# Patient Record
Sex: MALE | Race: WHITE | Employment: FULL TIME | URBAN - METROPOLITAN AREA
[De-identification: names, ages, dates, MRNs, and addresses within clinical notes are randomized per-mention and may not be internally consistent; named-entity substitution may affect disease eponyms.]

---

## 2018-07-12 ENCOUNTER — HOSPITAL ENCOUNTER (EMERGENCY)
Age: 58
Discharge: HOME | End: 2018-07-12
Attending: EMERGENCY MEDICINE
Payer: COMMERCIAL

## 2018-07-12 ENCOUNTER — APPOINTMENT (OUTPATIENT)
Dept: CT IMAGING | Age: 58
End: 2018-07-12
Payer: COMMERCIAL

## 2018-07-12 VITALS
TEMPERATURE: 97.9 F | HEIGHT: 71 IN | OXYGEN SATURATION: 97 % | BODY MASS INDEX: 26.6 KG/M2 | DIASTOLIC BLOOD PRESSURE: 75 MMHG | SYSTOLIC BLOOD PRESSURE: 135 MMHG | WEIGHT: 190 LBS | RESPIRATION RATE: 16 BRPM | HEART RATE: 71 BPM

## 2018-07-12 DIAGNOSIS — N20.0 KIDNEY STONE: Primary | ICD-10-CM

## 2018-07-12 LAB
ALBUMIN SERPL-MCNC: 4.2 G/DL (ref 3.9–4.9)
ALP BLD-CCNC: 63 U/L (ref 35–104)
ALT SERPL-CCNC: 38 U/L (ref 0–41)
ANION GAP SERPL CALCULATED.3IONS-SCNC: 12 MEQ/L (ref 7–13)
AST SERPL-CCNC: 25 U/L (ref 0–40)
BASOPHILS ABSOLUTE: 0.1 K/UL (ref 0–0.2)
BASOPHILS RELATIVE PERCENT: 0.4 %
BILIRUB SERPL-MCNC: 0.6 MG/DL (ref 0–1.2)
BUN BLDV-MCNC: 23 MG/DL (ref 6–20)
CALCIUM SERPL-MCNC: 8.9 MG/DL (ref 8.6–10.2)
CHLORIDE BLD-SCNC: 98 MEQ/L (ref 98–107)
CO2: 27 MEQ/L (ref 22–29)
CREAT SERPL-MCNC: 0.8 MG/DL (ref 0.7–1.2)
EOSINOPHILS ABSOLUTE: 0 K/UL (ref 0–0.7)
EOSINOPHILS RELATIVE PERCENT: 0.3 %
GFR AFRICAN AMERICAN: >60
GFR NON-AFRICAN AMERICAN: >60
GLOBULIN: 3.1 G/DL (ref 2.3–3.5)
GLUCOSE BLD-MCNC: 252 MG/DL (ref 74–109)
HCT VFR BLD CALC: 42.8 % (ref 42–52)
HEMOGLOBIN: 15 G/DL (ref 14–18)
LIPASE: 34 U/L (ref 13–60)
LYMPHOCYTES ABSOLUTE: 1.8 K/UL (ref 1–4.8)
LYMPHOCYTES RELATIVE PERCENT: 14.1 %
MCH RBC QN AUTO: 30.9 PG (ref 27–31.3)
MCHC RBC AUTO-ENTMCNC: 35 % (ref 33–37)
MCV RBC AUTO: 88.2 FL (ref 80–100)
MONOCYTES ABSOLUTE: 0.6 K/UL (ref 0.2–0.8)
MONOCYTES RELATIVE PERCENT: 5.1 %
NEUTROPHILS ABSOLUTE: 10.2 K/UL (ref 1.4–6.5)
NEUTROPHILS RELATIVE PERCENT: 80.1 %
PDW BLD-RTO: 13 % (ref 11.5–14.5)
PLATELET # BLD: 240 K/UL (ref 130–400)
POTASSIUM SERPL-SCNC: 3.8 MEQ/L (ref 3.5–5.1)
RBC # BLD: 4.85 M/UL (ref 4.7–6.1)
SODIUM BLD-SCNC: 137 MEQ/L (ref 132–144)
TOTAL PROTEIN: 7.3 G/DL (ref 6.4–8.1)
WBC # BLD: 12.7 K/UL (ref 4.8–10.8)

## 2018-07-12 PROCEDURE — 74176 CT ABD & PELVIS W/O CONTRAST: CPT

## 2018-07-12 PROCEDURE — 99284 EMERGENCY DEPT VISIT MOD MDM: CPT

## 2018-07-12 PROCEDURE — 85025 COMPLETE CBC W/AUTO DIFF WBC: CPT

## 2018-07-12 PROCEDURE — 2580000003 HC RX 258: Performed by: EMERGENCY MEDICINE

## 2018-07-12 PROCEDURE — 83690 ASSAY OF LIPASE: CPT

## 2018-07-12 PROCEDURE — 6360000002 HC RX W HCPCS: Performed by: EMERGENCY MEDICINE

## 2018-07-12 PROCEDURE — 96375 TX/PRO/DX INJ NEW DRUG ADDON: CPT

## 2018-07-12 PROCEDURE — 80053 COMPREHEN METABOLIC PANEL: CPT

## 2018-07-12 PROCEDURE — 36415 COLL VENOUS BLD VENIPUNCTURE: CPT

## 2018-07-12 PROCEDURE — 96374 THER/PROPH/DIAG INJ IV PUSH: CPT

## 2018-07-12 RX ORDER — ONDANSETRON 4 MG/1
4 TABLET, FILM COATED ORAL EVERY 8 HOURS PRN
Qty: 20 TABLET | Refills: 0 | Status: ON HOLD | OUTPATIENT
Start: 2018-07-12 | End: 2021-11-03

## 2018-07-12 RX ORDER — 0.9 % SODIUM CHLORIDE 0.9 %
1000 INTRAVENOUS SOLUTION INTRAVENOUS ONCE
Status: COMPLETED | OUTPATIENT
Start: 2018-07-12 | End: 2018-07-12

## 2018-07-12 RX ORDER — TRAMADOL HYDROCHLORIDE 50 MG/1
50 TABLET ORAL EVERY 6 HOURS PRN
COMMUNITY

## 2018-07-12 RX ORDER — LEVOTHYROXINE SODIUM 0.07 MG/1
75 TABLET ORAL DAILY
COMMUNITY

## 2018-07-12 RX ORDER — LOSARTAN POTASSIUM 25 MG/1
25 TABLET ORAL DAILY
Status: ON HOLD | COMMUNITY
End: 2021-11-03

## 2018-07-12 RX ORDER — GABAPENTIN 600 MG/1
600 TABLET ORAL 3 TIMES DAILY
COMMUNITY

## 2018-07-12 RX ORDER — OXYCODONE HYDROCHLORIDE AND ACETAMINOPHEN 5; 325 MG/1; MG/1
1 TABLET ORAL EVERY 6 HOURS PRN
Qty: 12 TABLET | Refills: 0 | Status: SHIPPED | OUTPATIENT
Start: 2018-07-12 | End: 2018-07-15

## 2018-07-12 RX ORDER — GLIPIZIDE 5 MG/1
5 TABLET ORAL DAILY
Status: ON HOLD | COMMUNITY
End: 2021-11-03

## 2018-07-12 RX ORDER — NAPROXEN 500 MG/1
500 TABLET ORAL 2 TIMES DAILY
Qty: 60 TABLET | Refills: 0 | Status: SHIPPED | OUTPATIENT
Start: 2018-07-12 | End: 2020-08-27

## 2018-07-12 RX ORDER — ATORVASTATIN CALCIUM 40 MG/1
40 TABLET, FILM COATED ORAL DAILY
COMMUNITY

## 2018-07-12 RX ORDER — TAMSULOSIN HYDROCHLORIDE 0.4 MG/1
0.4 CAPSULE ORAL DAILY
Qty: 30 CAPSULE | Refills: 0 | Status: SHIPPED | OUTPATIENT
Start: 2018-07-12 | End: 2019-08-10

## 2018-07-12 RX ORDER — KETOROLAC TROMETHAMINE 30 MG/ML
30 INJECTION, SOLUTION INTRAMUSCULAR; INTRAVENOUS ONCE
Status: COMPLETED | OUTPATIENT
Start: 2018-07-12 | End: 2018-07-12

## 2018-07-12 RX ORDER — ONDANSETRON 2 MG/ML
4 INJECTION INTRAMUSCULAR; INTRAVENOUS ONCE
Status: COMPLETED | OUTPATIENT
Start: 2018-07-12 | End: 2018-07-12

## 2018-07-12 RX ORDER — PIOGLITAZONEHYDROCHLORIDE 45 MG/1
45 TABLET ORAL DAILY
Status: ON HOLD | COMMUNITY
End: 2021-11-03

## 2018-07-12 RX ADMIN — KETOROLAC TROMETHAMINE 30 MG: 30 INJECTION, SOLUTION INTRAMUSCULAR at 17:52

## 2018-07-12 RX ADMIN — SODIUM CHLORIDE 1000 ML: 9 INJECTION, SOLUTION INTRAVENOUS at 17:52

## 2018-07-12 RX ADMIN — HYDROMORPHONE HYDROCHLORIDE 1 MG: 1 INJECTION, SOLUTION INTRAMUSCULAR; INTRAVENOUS; SUBCUTANEOUS at 17:52

## 2018-07-12 RX ADMIN — ONDANSETRON 4 MG: 2 INJECTION, SOLUTION INTRAMUSCULAR; INTRAVENOUS at 17:52

## 2018-07-12 ASSESSMENT — ENCOUNTER SYMPTOMS
BACK PAIN: 0
ABDOMINAL PAIN: 1
NAUSEA: 1
VOMITING: 1
COUGH: 0
DIARRHEA: 0
SORE THROAT: 0
SHORTNESS OF BREATH: 0

## 2018-07-12 ASSESSMENT — PAIN DESCRIPTION - LOCATION: LOCATION: ABDOMEN;BACK

## 2018-07-12 ASSESSMENT — PAIN SCALES - GENERAL
PAINLEVEL_OUTOF10: 4
PAINLEVEL_OUTOF10: 8
PAINLEVEL_OUTOF10: 10

## 2018-07-12 ASSESSMENT — PAIN DESCRIPTION - PAIN TYPE: TYPE: ACUTE PAIN

## 2018-07-12 NOTE — ED PROVIDER NOTES
MG TABLET    Take 50 mg by mouth every 6 hours as needed for Pain. Pam[facility] ALLERGIES     Patient has no known allergies. FAMILY HISTORY     History reviewed. No pertinent family history. SOCIAL HISTORY       Social History     Social History    Marital status:      Spouse name: N/A    Number of children: N/A    Years of education: N/A     Social History Main Topics    Smoking status: Former Smoker    Smokeless tobacco: Never Used    Alcohol use No    Drug use: No    Sexual activity: Not Asked     Other Topics Concern    None     Social History Narrative    None         PHYSICAL EXAM       ED Triage Vitals [07/12/18 1706]   BP Temp Temp Source Pulse Resp SpO2 Height Weight   (!) 145/77 97.9 °F (36.6 °C) Oral 59 14 99 % 5' 11\" (1.803 m) 190 lb (86.2 kg)       Physical Exam   Constitutional: He is oriented to person, place, and time. He appears well-developed. He appears distressed. HENT:   Head: Normocephalic. Right Ear: External ear normal.   Left Ear: External ear normal.   Mouth/Throat: Oropharynx is clear and moist.   Eyes: Conjunctivae are normal. Pupils are equal, round, and reactive to light. Neck: Normal range of motion. Neck supple. Cardiovascular: Normal rate, regular rhythm and normal heart sounds. Pulmonary/Chest: Effort normal and breath sounds normal.   Abdominal: Soft. Bowel sounds are normal. He exhibits no distension. There is tenderness. Left sided flank pain radiating to LLQ. No guarding or peritoneal signs. Musculoskeletal: Normal range of motion. Neurological: He is alert and oriented to person, place, and time. Skin: Skin is warm and dry. Psychiatric: He has a normal mood and affect. Nursing note and vitals reviewed. MDM  Patient presented to ED with abdominal pain, nausea, and vomiting. Patient was given 1L NS, IV toradol, IV zofran, and IV dilaudid. Labs remarkable for WBC 13, glucose 252. CT AP shows 2 mm kidney stone at L UVJ.   Pt reassessed and feels much better. Pt educated thoroughly about kidney stones. Pt given prescription for percocet, zofran, naproxen, flomax. Pt does not want to f/u with urologist as this is not a recurring problem. Pt given kidney stone warning signs and f/u with pcp. Pt understands plan. FINAL IMPRESSION      1. Kidney stone          DISPOSITION/PLAN   DISPOSITION Decision To Discharge 07/12/2018 06:52:13 PM        DISCHARGE MEDICATIONS:  New Prescriptions    NAPROXEN (NAPROSYN) 500 MG TABLET    Take 1 tablet by mouth 2 times daily    ONDANSETRON (ZOFRAN) 4 MG TABLET    Take 1 tablet by mouth every 8 hours as needed for Nausea or Vomiting    OXYCODONE-ACETAMINOPHEN (PERCOCET) 5-325 MG PER TABLET    Take 1 tablet by mouth every 6 hours as needed for Pain for up to 3 days. Intended supply: 3 days. Take lowest dose possible to manage pain.     TAMSULOSIN (FLOMAX) 0.4 MG CAPSULE    Take 1 capsule by mouth daily            Dana Quezada MD (electronically signed)  Attending Emergency Physician            Dana Quezada MD  07/12/18 6066

## 2019-08-10 ENCOUNTER — APPOINTMENT (OUTPATIENT)
Dept: CT IMAGING | Age: 59
End: 2019-08-10
Payer: COMMERCIAL

## 2019-08-10 ENCOUNTER — HOSPITAL ENCOUNTER (EMERGENCY)
Age: 59
Discharge: HOME | End: 2019-08-10
Attending: EMERGENCY MEDICINE
Payer: COMMERCIAL

## 2019-08-10 VITALS
HEIGHT: 71 IN | SYSTOLIC BLOOD PRESSURE: 125 MMHG | BODY MASS INDEX: 26.6 KG/M2 | OXYGEN SATURATION: 98 % | WEIGHT: 190 LBS | DIASTOLIC BLOOD PRESSURE: 70 MMHG | HEART RATE: 74 BPM | RESPIRATION RATE: 18 BRPM | TEMPERATURE: 97.3 F

## 2019-08-10 DIAGNOSIS — N20.0 KIDNEY STONE: Primary | ICD-10-CM

## 2019-08-10 PROCEDURE — 96375 TX/PRO/DX INJ NEW DRUG ADDON: CPT

## 2019-08-10 PROCEDURE — 6370000000 HC RX 637 (ALT 250 FOR IP): Performed by: EMERGENCY MEDICINE

## 2019-08-10 PROCEDURE — 2580000003 HC RX 258: Performed by: EMERGENCY MEDICINE

## 2019-08-10 PROCEDURE — 99284 EMERGENCY DEPT VISIT MOD MDM: CPT

## 2019-08-10 PROCEDURE — 74176 CT ABD & PELVIS W/O CONTRAST: CPT

## 2019-08-10 PROCEDURE — 96374 THER/PROPH/DIAG INJ IV PUSH: CPT

## 2019-08-10 PROCEDURE — 80053 COMPREHEN METABOLIC PANEL: CPT

## 2019-08-10 PROCEDURE — 85025 COMPLETE CBC W/AUTO DIFF WBC: CPT

## 2019-08-10 PROCEDURE — 6360000002 HC RX W HCPCS: Performed by: EMERGENCY MEDICINE

## 2019-08-10 RX ORDER — MORPHINE SULFATE 2 MG/ML
4 INJECTION, SOLUTION INTRAMUSCULAR; INTRAVENOUS
Status: DISCONTINUED | OUTPATIENT
Start: 2019-08-10 | End: 2019-08-10 | Stop reason: HOSPADM

## 2019-08-10 RX ORDER — 0.9 % SODIUM CHLORIDE 0.9 %
1000 INTRAVENOUS SOLUTION INTRAVENOUS ONCE
Status: COMPLETED | OUTPATIENT
Start: 2019-08-10 | End: 2019-08-10

## 2019-08-10 RX ORDER — OXYCODONE HYDROCHLORIDE AND ACETAMINOPHEN 5; 325 MG/1; MG/1
2 TABLET ORAL ONCE
Status: COMPLETED | OUTPATIENT
Start: 2019-08-10 | End: 2019-08-10

## 2019-08-10 RX ORDER — ONDANSETRON 4 MG/1
4 TABLET, ORALLY DISINTEGRATING ORAL 3 TIMES DAILY PRN
Qty: 21 TABLET | Refills: 0 | Status: ON HOLD | OUTPATIENT
Start: 2019-08-10 | End: 2021-11-03

## 2019-08-10 RX ORDER — OXYCODONE HYDROCHLORIDE AND ACETAMINOPHEN 5; 325 MG/1; MG/1
1 TABLET ORAL EVERY 6 HOURS PRN
Qty: 12 TABLET | Refills: 0 | Status: SHIPPED | OUTPATIENT
Start: 2019-08-10 | End: 2019-08-13

## 2019-08-10 RX ORDER — TAMSULOSIN HYDROCHLORIDE 0.4 MG/1
0.4 CAPSULE ORAL DAILY
Qty: 30 CAPSULE | Refills: 0 | Status: ON HOLD | OUTPATIENT
Start: 2019-08-10 | End: 2021-11-03

## 2019-08-10 RX ORDER — ONDANSETRON 2 MG/ML
4 INJECTION INTRAMUSCULAR; INTRAVENOUS ONCE
Status: COMPLETED | OUTPATIENT
Start: 2019-08-10 | End: 2019-08-10

## 2019-08-10 RX ORDER — KETOROLAC TROMETHAMINE 30 MG/ML
30 INJECTION, SOLUTION INTRAMUSCULAR; INTRAVENOUS ONCE
Status: COMPLETED | OUTPATIENT
Start: 2019-08-10 | End: 2019-08-10

## 2019-08-10 RX ADMIN — MORPHINE SULFATE 4 MG: 2 INJECTION, SOLUTION INTRAMUSCULAR; INTRAVENOUS at 18:23

## 2019-08-10 RX ADMIN — ONDANSETRON 4 MG: 2 INJECTION INTRAMUSCULAR; INTRAVENOUS at 18:23

## 2019-08-10 RX ADMIN — OXYCODONE HYDROCHLORIDE AND ACETAMINOPHEN 2 TABLET: 5; 325 TABLET ORAL at 19:46

## 2019-08-10 RX ADMIN — KETOROLAC TROMETHAMINE 30 MG: 30 INJECTION, SOLUTION INTRAMUSCULAR at 18:22

## 2019-08-10 RX ADMIN — SODIUM CHLORIDE 1000 ML: 9 INJECTION, SOLUTION INTRAVENOUS at 18:22

## 2019-08-10 ASSESSMENT — PAIN DESCRIPTION - FREQUENCY
FREQUENCY: INTERMITTENT
FREQUENCY: INTERMITTENT

## 2019-08-10 ASSESSMENT — PAIN SCALES - GENERAL
PAINLEVEL_OUTOF10: 9
PAINLEVEL_OUTOF10: 4
PAINLEVEL_OUTOF10: 9
PAINLEVEL_OUTOF10: 4

## 2019-08-10 ASSESSMENT — ENCOUNTER SYMPTOMS
SHORTNESS OF BREATH: 0
COUGH: 0
SORE THROAT: 0
NAUSEA: 1
BACK PAIN: 0
ABDOMINAL PAIN: 0
VOMITING: 0
DIARRHEA: 0

## 2019-08-10 ASSESSMENT — PAIN DESCRIPTION - ORIENTATION: ORIENTATION: LEFT

## 2019-08-10 ASSESSMENT — PAIN DESCRIPTION - DESCRIPTORS: DESCRIPTORS: SHARP

## 2019-08-10 ASSESSMENT — PAIN DESCRIPTION - LOCATION: LOCATION: FLANK

## 2019-08-10 ASSESSMENT — PAIN DESCRIPTION - PROGRESSION: CLINICAL_PROGRESSION: GRADUALLY IMPROVING

## 2019-08-11 LAB
ALBUMIN SERPL-MCNC: 4.3 G/DL (ref 3.5–4.6)
ALP BLD-CCNC: 67 U/L (ref 35–104)
ALT SERPL-CCNC: 24 U/L (ref 0–41)
ANION GAP SERPL CALCULATED.3IONS-SCNC: 12 MEQ/L (ref 9–15)
AST SERPL-CCNC: 19 U/L (ref 0–40)
BASOPHILS ABSOLUTE: 0 K/UL (ref 0–0.2)
BASOPHILS RELATIVE PERCENT: 0.5 %
BILIRUB SERPL-MCNC: 0.3 MG/DL (ref 0.2–0.7)
BUN BLDV-MCNC: 25 MG/DL (ref 6–20)
CALCIUM SERPL-MCNC: 9.2 MG/DL (ref 8.5–9.9)
CHLORIDE BLD-SCNC: 93 MEQ/L (ref 95–107)
CO2: 30 MEQ/L (ref 20–31)
CREAT SERPL-MCNC: 0.75 MG/DL (ref 0.7–1.2)
EOSINOPHILS ABSOLUTE: 0 K/UL (ref 0–0.7)
EOSINOPHILS RELATIVE PERCENT: 0.2 %
GFR AFRICAN AMERICAN: >60
GFR NON-AFRICAN AMERICAN: >60
GLOBULIN: 3.3 G/DL (ref 2.3–3.5)
GLUCOSE BLD-MCNC: 208 MG/DL (ref 70–99)
HCT VFR BLD CALC: 40.2 % (ref 42–52)
HEMOGLOBIN: 14.4 G/DL (ref 14–18)
LYMPHOCYTES ABSOLUTE: 1.9 K/UL (ref 1–4.8)
LYMPHOCYTES RELATIVE PERCENT: 19.3 %
MCH RBC QN AUTO: 31.3 PG (ref 27–31.3)
MCHC RBC AUTO-ENTMCNC: 35.8 % (ref 33–37)
MCV RBC AUTO: 87.2 FL (ref 80–100)
MONOCYTES ABSOLUTE: 0.7 K/UL (ref 0.2–0.8)
MONOCYTES RELATIVE PERCENT: 7 %
NEUTROPHILS ABSOLUTE: 7.4 K/UL (ref 1.4–6.5)
NEUTROPHILS RELATIVE PERCENT: 73 %
PDW BLD-RTO: 13.2 % (ref 11.5–14.5)
PLATELET # BLD: 255 K/UL (ref 130–400)
POTASSIUM SERPL-SCNC: 3.1 MEQ/L (ref 3.4–4.9)
RBC # BLD: 4.6 M/UL (ref 4.7–6.1)
SODIUM BLD-SCNC: 135 MEQ/L (ref 135–144)
TOTAL PROTEIN: 7.6 G/DL (ref 6.3–8)
WBC # BLD: 10.1 K/UL (ref 4.8–10.8)

## 2020-03-16 ENCOUNTER — APPOINTMENT (OUTPATIENT)
Dept: GENERAL RADIOLOGY | Age: 60
End: 2020-03-16
Payer: COMMERCIAL

## 2020-03-16 ENCOUNTER — HOSPITAL ENCOUNTER (EMERGENCY)
Age: 60
Discharge: HOME | End: 2020-03-16
Payer: COMMERCIAL

## 2020-03-16 VITALS
RESPIRATION RATE: 18 BRPM | BODY MASS INDEX: 27.16 KG/M2 | DIASTOLIC BLOOD PRESSURE: 77 MMHG | TEMPERATURE: 98.3 F | OXYGEN SATURATION: 97 % | HEIGHT: 71 IN | HEART RATE: 80 BPM | WEIGHT: 194 LBS | SYSTOLIC BLOOD PRESSURE: 141 MMHG

## 2020-03-16 LAB
ALBUMIN SERPL-MCNC: 3.9 G/DL (ref 3.5–4.6)
ALP BLD-CCNC: 59 U/L (ref 35–104)
ALT SERPL-CCNC: 41 U/L (ref 0–41)
ANION GAP SERPL CALCULATED.3IONS-SCNC: 15 MEQ/L (ref 9–15)
AST SERPL-CCNC: 40 U/L (ref 0–40)
BASOPHILS ABSOLUTE: 0.1 K/UL (ref 0–0.2)
BASOPHILS RELATIVE PERCENT: 0.8 %
BILIRUB SERPL-MCNC: <0.2 MG/DL (ref 0.2–0.7)
BUN BLDV-MCNC: 18 MG/DL (ref 6–20)
CALCIUM SERPL-MCNC: 8.7 MG/DL (ref 8.5–9.9)
CHLORIDE BLD-SCNC: 95 MEQ/L (ref 95–107)
CO2: 28 MEQ/L (ref 20–31)
CREAT SERPL-MCNC: 0.69 MG/DL (ref 0.7–1.2)
EOSINOPHILS ABSOLUTE: 0 K/UL (ref 0–0.7)
EOSINOPHILS RELATIVE PERCENT: 0.3 %
GFR AFRICAN AMERICAN: >60
GFR NON-AFRICAN AMERICAN: >60
GLOBULIN: 3.3 G/DL (ref 2.3–3.5)
GLUCOSE BLD-MCNC: 123 MG/DL (ref 70–99)
HCT VFR BLD CALC: 39 % (ref 42–52)
HEMOGLOBIN: 13.6 G/DL (ref 14–18)
INFLUENZA A BY PCR: POSITIVE
INFLUENZA B BY PCR: NEGATIVE
LYMPHOCYTES ABSOLUTE: 1.1 K/UL (ref 1–4.8)
LYMPHOCYTES RELATIVE PERCENT: 14.3 %
MCH RBC QN AUTO: 29.8 PG (ref 27–31.3)
MCHC RBC AUTO-ENTMCNC: 34.8 % (ref 33–37)
MCV RBC AUTO: 85.8 FL (ref 80–100)
MONOCYTES ABSOLUTE: 1.1 K/UL (ref 0.2–0.8)
MONOCYTES RELATIVE PERCENT: 13.7 %
NEUTROPHILS ABSOLUTE: 5.5 K/UL (ref 1.4–6.5)
NEUTROPHILS RELATIVE PERCENT: 70.9 %
PDW BLD-RTO: 13 % (ref 11.5–14.5)
PLATELET # BLD: 278 K/UL (ref 130–400)
POTASSIUM SERPL-SCNC: 3 MEQ/L (ref 3.4–4.9)
RBC # BLD: 4.55 M/UL (ref 4.7–6.1)
SODIUM BLD-SCNC: 138 MEQ/L (ref 135–144)
TOTAL PROTEIN: 7.2 G/DL (ref 6.3–8)
WBC # BLD: 7.8 K/UL (ref 4.8–10.8)

## 2020-03-16 PROCEDURE — 85025 COMPLETE CBC W/AUTO DIFF WBC: CPT

## 2020-03-16 PROCEDURE — 6370000000 HC RX 637 (ALT 250 FOR IP): Performed by: PHYSICIAN ASSISTANT

## 2020-03-16 PROCEDURE — 96374 THER/PROPH/DIAG INJ IV PUSH: CPT

## 2020-03-16 PROCEDURE — 6360000002 HC RX W HCPCS: Performed by: PHYSICIAN ASSISTANT

## 2020-03-16 PROCEDURE — 99283 EMERGENCY DEPT VISIT LOW MDM: CPT

## 2020-03-16 PROCEDURE — 74022 RADEX COMPL AQT ABD SERIES: CPT

## 2020-03-16 PROCEDURE — 80053 COMPREHEN METABOLIC PANEL: CPT

## 2020-03-16 PROCEDURE — 87502 INFLUENZA DNA AMP PROBE: CPT

## 2020-03-16 PROCEDURE — 2580000003 HC RX 258: Performed by: PHYSICIAN ASSISTANT

## 2020-03-16 PROCEDURE — 36415 COLL VENOUS BLD VENIPUNCTURE: CPT

## 2020-03-16 PROCEDURE — 6370000000 HC RX 637 (ALT 250 FOR IP): Performed by: EMERGENCY MEDICINE

## 2020-03-16 RX ORDER — POTASSIUM CHLORIDE 20 MEQ/1
40 TABLET, EXTENDED RELEASE ORAL ONCE
Status: COMPLETED | OUTPATIENT
Start: 2020-03-16 | End: 2020-03-16

## 2020-03-16 RX ORDER — OSELTAMIVIR PHOSPHATE 75 MG/1
75 CAPSULE ORAL 2 TIMES DAILY
Qty: 10 CAPSULE | Refills: 0 | Status: SHIPPED | OUTPATIENT
Start: 2020-03-16 | End: 2020-03-21

## 2020-03-16 RX ORDER — OSELTAMIVIR PHOSPHATE 75 MG/1
75 CAPSULE ORAL ONCE
Status: COMPLETED | OUTPATIENT
Start: 2020-03-16 | End: 2020-03-16

## 2020-03-16 RX ORDER — 0.9 % SODIUM CHLORIDE 0.9 %
1000 INTRAVENOUS SOLUTION INTRAVENOUS ONCE
Status: COMPLETED | OUTPATIENT
Start: 2020-03-16 | End: 2020-03-16

## 2020-03-16 RX ORDER — METHYLPREDNISOLONE SODIUM SUCCINATE 125 MG/2ML
125 INJECTION, POWDER, LYOPHILIZED, FOR SOLUTION INTRAMUSCULAR; INTRAVENOUS ONCE
Status: COMPLETED | OUTPATIENT
Start: 2020-03-16 | End: 2020-03-16

## 2020-03-16 RX ADMIN — SODIUM CHLORIDE 1000 ML: 9 INJECTION, SOLUTION INTRAVENOUS at 19:35

## 2020-03-16 RX ADMIN — ALBUTEROL SULFATE 5 MG: 2.5 SOLUTION RESPIRATORY (INHALATION) at 19:18

## 2020-03-16 RX ADMIN — ALBUTEROL SULFATE 5 MG: 2.5 SOLUTION RESPIRATORY (INHALATION) at 19:15

## 2020-03-16 RX ADMIN — ALBUTEROL SULFATE 5 MG: 2.5 SOLUTION RESPIRATORY (INHALATION) at 19:17

## 2020-03-16 RX ADMIN — POTASSIUM CHLORIDE 40 MEQ: 20 TABLET, EXTENDED RELEASE ORAL at 20:39

## 2020-03-16 RX ADMIN — METHYLPREDNISOLONE SODIUM SUCCINATE 125 MG: 125 INJECTION, POWDER, FOR SOLUTION INTRAMUSCULAR; INTRAVENOUS at 19:35

## 2020-03-16 RX ADMIN — OSELTAMIVIR PHOSPHATE 75 MG: 75 CAPSULE ORAL at 20:59

## 2020-03-16 ASSESSMENT — ENCOUNTER SYMPTOMS
RHINORRHEA: 0
COUGH: 1
SHORTNESS OF BREATH: 1
BACK PAIN: 0
SINUS PAIN: 0
EYE REDNESS: 0
CHEST TIGHTNESS: 0
COLOR CHANGE: 0
ABDOMINAL PAIN: 0
VOMITING: 0
EYE DISCHARGE: 0
DIARRHEA: 0
NAUSEA: 0

## 2020-03-16 NOTE — ED PROVIDER NOTES
Mouth: Mucous membranes are moist.   Eyes:      Extraocular Movements: Extraocular movements intact. Pupils: Pupils are equal, round, and reactive to light. Neck:      Musculoskeletal: Normal range of motion and neck supple. Cardiovascular:      Rate and Rhythm: Normal rate and regular rhythm. Pulses: Normal pulses. Heart sounds: Normal heart sounds. Pulmonary:      Effort: Pulmonary effort is normal.      Breath sounds: Examination of the right-middle field reveals wheezing. Examination of the left-middle field reveals wheezing. Examination of the right-lower field reveals wheezing. Examination of the left-lower field reveals wheezing. Wheezing present. Abdominal:      General: Abdomen is flat. Bowel sounds are normal. There is no distension. Palpations: Abdomen is soft. Tenderness: There is no abdominal tenderness. Musculoskeletal: Normal range of motion. General: No swelling, tenderness or signs of injury. Skin:     General: Skin is warm and dry. Capillary Refill: Capillary refill takes less than 2 seconds. Findings: No erythema. Neurological:      General: No focal deficit present. Mental Status: He is alert and oriented to person, place, and time. Psychiatric:         Mood and Affect: Mood normal.         DIAGNOSTIC RESULTS     EKG: All EKG's are interpreted by the Emergency Department Physician who either signs or Co-signsthis chart in the absence of a cardiologist.        RADIOLOGY:   Cass Blotter such as CT, Ultrasound and MRI are read by the radiologist. Plain radiographic images are visualized and preliminarily interpreted by the emergency physician with the below findings:    Negative for infiltrates or signs of infection.      Interpretation per the Radiologist below, if available at the time ofthis note:    XR Acute Abd Series Chest 1 VW    (Results Pending)         ED BEDSIDE ULTRASOUND:   Performed by ED Physician -

## 2020-08-27 ENCOUNTER — HOSPITAL ENCOUNTER (EMERGENCY)
Age: 60
Discharge: HOME | End: 2020-08-27
Payer: COMMERCIAL

## 2020-08-27 ENCOUNTER — APPOINTMENT (OUTPATIENT)
Dept: GENERAL RADIOLOGY | Age: 60
End: 2020-08-27
Payer: COMMERCIAL

## 2020-08-27 VITALS
BODY MASS INDEX: 27.3 KG/M2 | SYSTOLIC BLOOD PRESSURE: 157 MMHG | DIASTOLIC BLOOD PRESSURE: 73 MMHG | HEART RATE: 86 BPM | TEMPERATURE: 97.6 F | OXYGEN SATURATION: 95 % | RESPIRATION RATE: 18 BRPM | WEIGHT: 195 LBS | HEIGHT: 71 IN

## 2020-08-27 PROCEDURE — 72110 X-RAY EXAM L-2 SPINE 4/>VWS: CPT

## 2020-08-27 PROCEDURE — 96372 THER/PROPH/DIAG INJ SC/IM: CPT

## 2020-08-27 PROCEDURE — 99283 EMERGENCY DEPT VISIT LOW MDM: CPT

## 2020-08-27 PROCEDURE — 6360000002 HC RX W HCPCS: Performed by: PHYSICIAN ASSISTANT

## 2020-08-27 RX ORDER — OXYCODONE HYDROCHLORIDE AND ACETAMINOPHEN 5; 325 MG/1; MG/1
1 TABLET ORAL EVERY 6 HOURS PRN
Qty: 12 TABLET | Refills: 0 | Status: SHIPPED | OUTPATIENT
Start: 2020-08-27 | End: 2020-08-30

## 2020-08-27 RX ORDER — CAPSAICIN 0.025 %
CREAM (GRAM) TOPICAL
Qty: 1 TUBE | Refills: 0 | Status: SHIPPED | OUTPATIENT
Start: 2020-08-27 | End: 2020-09-26

## 2020-08-27 RX ORDER — METHYLPREDNISOLONE ACETATE 80 MG/ML
80 INJECTION, SUSPENSION INTRA-ARTICULAR; INTRALESIONAL; INTRAMUSCULAR; SOFT TISSUE ONCE
Status: COMPLETED | OUTPATIENT
Start: 2020-08-27 | End: 2020-08-27

## 2020-08-27 RX ADMIN — METHYLPREDNISOLONE ACETATE 80 MG: 80 INJECTION, SUSPENSION INTRA-ARTICULAR; INTRALESIONAL; INTRAMUSCULAR; SOFT TISSUE at 18:08

## 2020-08-27 ASSESSMENT — ENCOUNTER SYMPTOMS
BACK PAIN: 1
EYES NEGATIVE: 1
RESPIRATORY NEGATIVE: 1
GASTROINTESTINAL NEGATIVE: 1

## 2020-08-27 ASSESSMENT — PAIN DESCRIPTION - ORIENTATION: ORIENTATION: LEFT

## 2020-08-27 ASSESSMENT — PAIN DESCRIPTION - PROGRESSION: CLINICAL_PROGRESSION: NOT CHANGED

## 2020-08-27 ASSESSMENT — PAIN SCALES - WONG BAKER: WONGBAKER_NUMERICALRESPONSE: 2

## 2020-08-27 ASSESSMENT — PAIN SCALES - GENERAL: PAINLEVEL_OUTOF10: 2

## 2020-08-27 ASSESSMENT — PAIN DESCRIPTION - FREQUENCY: FREQUENCY: INTERMITTENT

## 2020-08-27 ASSESSMENT — PAIN - FUNCTIONAL ASSESSMENT: PAIN_FUNCTIONAL_ASSESSMENT: ACTIVITIES ARE NOT PREVENTED

## 2020-08-27 ASSESSMENT — PAIN DESCRIPTION - ONSET: ONSET: ON-GOING

## 2020-08-27 ASSESSMENT — PAIN DESCRIPTION - LOCATION: LOCATION: BACK

## 2020-08-27 ASSESSMENT — PAIN DESCRIPTION - DESCRIPTORS: DESCRIPTORS: SHOOTING

## 2020-08-27 ASSESSMENT — PAIN DESCRIPTION - PAIN TYPE: TYPE: ACUTE PAIN

## 2020-08-27 NOTE — ED TRIAGE NOTES
Pt reports that he is having lower back pain on the left side shooting down leg pt alert rr even non labored ambulated to triage

## 2020-08-27 NOTE — ED PROVIDER NOTES
[facility] ED  eMERGENCY dEPARTMENT eNCOUnter      Pt Name: Josesito Chapman  MRN: [id number]  [place] 1960  Date of evaluation: 8/27/2020  Provider: Janice Vela PA-C      HISTORY OF PRESENT ILLNESS    Josesito Chapman is a 64 y.o. male who presents to the Emergency Department with chief complaint of left hip and low back pain. Patient states he had a history of a traumatic back injury several years ago with surgery and hardware placement. Patient states over the last month his pain has been radiating down his left lower leg. He denies loss in bowel or bladder function he also denies numbness and tingling in the private area. Patient states he was given a shot of Toradol at his primary care physician's office yesterday without any relief. Patient drove himself here to the emergency room. Patient has a follow-up with Dr. Geoff Feliciano, but it is not for 1 month. REVIEW OF SYSTEMS       Review of Systems   Constitutional: Negative. HENT: Negative. Eyes: Negative. Respiratory: Negative. Cardiovascular: Negative. Gastrointestinal: Negative. Endocrine: Negative. Genitourinary: Negative. Musculoskeletal: Positive for back pain. Skin: Negative. Neurological: Negative. Psychiatric/Behavioral: Negative. PAST MEDICAL HISTORY     Past Medical History:   Diagnosis Date    Diabetes mellitus ([address]     Hyperlipidemia     Hypertension     Kidney stones     Neuropathy     Thyroid disease          SURGICAL HISTORY       Past Surgical History:   Procedure Laterality Date    BACK SURGERY      KNEE SURGERY Right          CURRENT MEDICATIONS       Previous Medications    ATORVASTATIN (LIPITOR) 40 MG TABLET    Take 40 mg by mouth daily    GABAPENTIN (NEURONTIN) 600 MG TABLET    Take 600 mg by mouth 3 times daily. Brendan Ch     GLIPIZIDE (GLUCOTROL) 5 MG TABLET    Take 5 mg by mouth daily    LEVOTHYROXINE (SYNTHROID) 75 MCG TABLET    Take 75 mcg by mouth Daily    LOSARTAN (COZAAR) 25 MG TABLET    Take 25 mg by mouth daily    ONDANSETRON (ZOFRAN) 4 MG TABLET    Take 1 tablet by mouth every 8 hours as needed for Nausea or Vomiting    ONDANSETRON (ZOFRAN-ODT) 4 MG DISINTEGRATING TABLET    Take 1 tablet by mouth 3 times daily as needed for Nausea or Vomiting    PIOGLITAZONE (ACTOS) 45 MG TABLET    Take 45 mg by mouth daily    TAMSULOSIN (FLOMAX) 0.4 MG CAPSULE    Take 1 capsule by mouth daily    TRAMADOL (ULTRAM) 50 MG TABLET    Take 50 mg by mouth every 6 hours as needed for Pain. Groton Plana ALLERGIES     Patient has no known allergies. FAMILY HISTORY     History reviewed. No pertinent family history.        SOCIAL HISTORY       Social History     Socioeconomic History    Marital status:      Spouse name: None    Number of children: None    Years of education: None    Highest education level: None   Occupational History    None   Social Needs    Financial resource strain: None    Food insecurity     Worry: None     Inability: None    Transportation needs     Medical: None     Non-medical: None   Tobacco Use    Smoking status: Former Smoker    Smokeless tobacco: Never Used   Substance and Sexual Activity    Alcohol use: No    Drug use: No    Sexual activity: None   Lifestyle    Physical activity     Days per week: None     Minutes per session: None    Stress: None   Relationships    Social connections     Talks on phone: None     Gets together: None     Attends Yazidism service: None     Active member of club or organization: None     Attends meetings of clubs or organizations: None     Relationship status: None    Intimate partner violence     Fear of current or ex partner: None     Emotionally abused: None     Physically abused: None     Forced sexual activity: None   Other Topics Concern    None   Social History Narrative    None       SCREENINGS      @FLOW([id number])@      PHYSICAL EXAM    (up to 7 for level 4, 8 or more for level 5)     ED Triage Vitals [08/27/20 1744]   BP Temp Temp Source Pulse Resp SpO2 Height Weight   (!) 157/73 97.6 °F (36.4 °C) Temporal 86 18 95 % 5' 11\" (1.803 m) 195 lb (88.5 kg)       Physical Exam  Constitutional:       General: He is not in acute distress. Appearance: He is well-developed. HENT:      Head: Normocephalic and atraumatic. Eyes:      Conjunctiva/sclera: Conjunctivae normal.      Pupils: Pupils are equal, round, and reactive to light. Neck:      Musculoskeletal: Normal range of motion and neck supple. Cardiovascular:      Rate and Rhythm: Normal rate and regular rhythm. Heart sounds: No murmur. Pulmonary:      Effort: No respiratory distress. Breath sounds: Normal breath sounds. No wheezing or rales. Abdominal:      General: There is no distension. Palpations: Abdomen is soft. Tenderness: There is no abdominal tenderness. Musculoskeletal: Normal range of motion. Left hip: He exhibits tenderness and bony tenderness. Lumbar back: He exhibits tenderness and bony tenderness. Back:         Legs:    Skin:     General: Skin is warm and dry. Findings: No erythema or rash. Neurological:      Mental Status: He is alert and oriented to person, place, and time. Cranial Nerves: No cranial nerve deficit. Psychiatric:         Judgment: Judgment normal.           All other labs were within normal range or not returned as of this dictation. EMERGENCY DEPARTMENT COURSE and DIFFERENTIALDIAGNOSIS/MDM:   Vitals:    Vitals:    08/27/20 1744   BP: (!) 157/73   Pulse: 86   Resp: 18   Temp: 97.6 °F (36.4 °C)   TempSrc: Temporal   SpO2: 95%   Weight: 195 lb (88.5 kg)   Height: 5' 11\" (1.803 m)        Patient given Depo-Medrol for pain while in the emergency room. OARRS reviewed and is negative. Patient on chronic Ultram prescription, but it is not helping his pain.   Patient will be placed on a temporary course of Percocet and is to hold off on taking Ultram.  Follow-up with  Geraldine Krishna or Dr. Helder Howe. Patient to also follow-up with primary care provider for reevaluation treatment. Return if symptoms worsen or if new concerning symptoms arise. Patient verbalizes understanding of plan at discharge and has no further questions. PROCEDURES:  Unless otherwise noted below, none     Procedures      FINAL IMPRESSION      1. Sciatica of left side    2.  Osteoarthritis of lumbar spine with myelopathy          DISPOSITION/PLAN   DISPOSITION Decision To Discharge 08/27/2020 06:48:23 PM          Thalia Pimentel PA-C (electronically signed)  Attending Emergency Physician  [address] [place] [address], DARSHAN  08/27/20 3328

## 2021-10-30 ENCOUNTER — HOSPITAL ENCOUNTER (EMERGENCY)
Age: 61
Discharge: ANOTHER HOSPITAL | End: 2021-10-30
Attending: EMERGENCY MEDICINE
Payer: COMMERCIAL

## 2021-10-30 ENCOUNTER — APPOINTMENT (OUTPATIENT)
Dept: GENERAL RADIOLOGY | Age: 61
End: 2021-10-30
Payer: COMMERCIAL

## 2021-10-30 VITALS
HEART RATE: 68 BPM | OXYGEN SATURATION: 95 % | TEMPERATURE: 98 F | RESPIRATION RATE: 18 BRPM | HEIGHT: 71 IN | SYSTOLIC BLOOD PRESSURE: 113 MMHG | DIASTOLIC BLOOD PRESSURE: 97 MMHG | WEIGHT: 177 LBS | BODY MASS INDEX: 24.78 KG/M2

## 2021-10-30 DIAGNOSIS — S68.012A AMPUTATION OF LEFT THUMB, INITIAL ENCOUNTER: Primary | ICD-10-CM

## 2021-10-30 LAB
ABO/RH: NORMAL
ALBUMIN SERPL-MCNC: 4.1 G/DL (ref 3.5–4.6)
ALP BLD-CCNC: 72 U/L (ref 35–104)
ALT SERPL-CCNC: 37 U/L (ref 0–41)
ANION GAP SERPL CALCULATED.3IONS-SCNC: 10 MEQ/L (ref 9–15)
ANTIBODY SCREEN: NORMAL
APTT: 25.2 SEC (ref 24.4–36.8)
AST SERPL-CCNC: 28 U/L (ref 0–40)
BASOPHILS ABSOLUTE: 0 K/UL (ref 0–0.2)
BASOPHILS RELATIVE PERCENT: 0.4 %
BILIRUB SERPL-MCNC: 0.6 MG/DL (ref 0.2–0.7)
BUN BLDV-MCNC: 16 MG/DL (ref 6–20)
CALCIUM SERPL-MCNC: 8.8 MG/DL (ref 8.5–9.9)
CHLORIDE BLD-SCNC: 97 MEQ/L (ref 95–107)
CO2: 30 MEQ/L (ref 20–31)
CREAT SERPL-MCNC: 0.66 MG/DL (ref 0.7–1.2)
EOSINOPHILS ABSOLUTE: 0.1 K/UL (ref 0–0.7)
EOSINOPHILS RELATIVE PERCENT: 0.8 %
GFR AFRICAN AMERICAN: >60
GFR NON-AFRICAN AMERICAN: >60
GLOBULIN: 2.6 G/DL (ref 2.3–3.5)
GLUCOSE BLD-MCNC: 213 MG/DL (ref 70–99)
HCT VFR BLD CALC: 41.5 % (ref 42–52)
HEMOGLOBIN: 14.5 G/DL (ref 14–18)
INR BLD: 1
LYMPHOCYTES ABSOLUTE: 2.9 K/UL (ref 1–4.8)
LYMPHOCYTES RELATIVE PERCENT: 31.1 %
MCH RBC QN AUTO: 29.7 PG (ref 27–31.3)
MCHC RBC AUTO-ENTMCNC: 34.9 % (ref 33–37)
MCV RBC AUTO: 85 FL (ref 80–100)
MONOCYTES ABSOLUTE: 0.7 K/UL (ref 0.2–0.8)
MONOCYTES RELATIVE PERCENT: 7.3 %
NEUTROPHILS ABSOLUTE: 5.6 K/UL (ref 1.4–6.5)
NEUTROPHILS RELATIVE PERCENT: 60.4 %
PDW BLD-RTO: 12.6 % (ref 11.5–14.5)
PLATELET # BLD: 265 K/UL (ref 130–400)
POTASSIUM SERPL-SCNC: 3 MEQ/L (ref 3.4–4.9)
PROTHROMBIN TIME: 13 SEC (ref 12.3–14.9)
RBC # BLD: 4.88 M/UL (ref 4.7–6.1)
SODIUM BLD-SCNC: 137 MEQ/L (ref 135–144)
TOTAL PROTEIN: 6.7 G/DL (ref 6.3–8)
WBC # BLD: 9.3 K/UL (ref 4.8–10.8)

## 2021-10-30 PROCEDURE — 80053 COMPREHEN METABOLIC PANEL: CPT

## 2021-10-30 PROCEDURE — 85025 COMPLETE CBC W/AUTO DIFF WBC: CPT

## 2021-10-30 PROCEDURE — 99283 EMERGENCY DEPT VISIT LOW MDM: CPT

## 2021-10-30 PROCEDURE — 73140 X-RAY EXAM OF FINGER(S): CPT

## 2021-10-30 PROCEDURE — 96365 THER/PROPH/DIAG IV INF INIT: CPT

## 2021-10-30 PROCEDURE — 85610 PROTHROMBIN TIME: CPT

## 2021-10-30 PROCEDURE — 86901 BLOOD TYPING SEROLOGIC RH(D): CPT

## 2021-10-30 PROCEDURE — 86850 RBC ANTIBODY SCREEN: CPT

## 2021-10-30 PROCEDURE — 2580000003 HC RX 258: Performed by: EMERGENCY MEDICINE

## 2021-10-30 PROCEDURE — 36415 COLL VENOUS BLD VENIPUNCTURE: CPT

## 2021-10-30 PROCEDURE — 6360000002 HC RX W HCPCS: Performed by: EMERGENCY MEDICINE

## 2021-10-30 PROCEDURE — 96375 TX/PRO/DX INJ NEW DRUG ADDON: CPT

## 2021-10-30 PROCEDURE — 85730 THROMBOPLASTIN TIME PARTIAL: CPT

## 2021-10-30 PROCEDURE — 86900 BLOOD TYPING SEROLOGIC ABO: CPT

## 2021-10-30 RX ORDER — ONDANSETRON 2 MG/ML
4 INJECTION INTRAMUSCULAR; INTRAVENOUS ONCE
Status: COMPLETED | OUTPATIENT
Start: 2021-10-30 | End: 2021-10-30

## 2021-10-30 RX ORDER — KETOROLAC TROMETHAMINE 30 MG/ML
30 INJECTION, SOLUTION INTRAMUSCULAR; INTRAVENOUS ONCE
Status: COMPLETED | OUTPATIENT
Start: 2021-10-30 | End: 2021-10-30

## 2021-10-30 RX ORDER — 0.9 % SODIUM CHLORIDE 0.9 %
1000 INTRAVENOUS SOLUTION INTRAVENOUS ONCE
Status: COMPLETED | OUTPATIENT
Start: 2021-10-30 | End: 2021-10-30

## 2021-10-30 RX ADMIN — CEFAZOLIN 1000 MG: 1 INJECTION, POWDER, FOR SOLUTION INTRAMUSCULAR; INTRAVENOUS at 15:40

## 2021-10-30 RX ADMIN — HYDROMORPHONE HYDROCHLORIDE 1 MG: 1 INJECTION, SOLUTION INTRAMUSCULAR; INTRAVENOUS; SUBCUTANEOUS at 15:40

## 2021-10-30 RX ADMIN — SODIUM CHLORIDE 1000 ML: 9 INJECTION, SOLUTION INTRAVENOUS at 15:39

## 2021-10-30 RX ADMIN — ONDANSETRON 4 MG: 2 INJECTION INTRAMUSCULAR; INTRAVENOUS at 15:40

## 2021-10-30 RX ADMIN — KETOROLAC TROMETHAMINE 30 MG: 30 INJECTION, SOLUTION INTRAMUSCULAR at 15:41

## 2021-10-30 ASSESSMENT — PAIN DESCRIPTION - ORIENTATION: ORIENTATION: LEFT

## 2021-10-30 ASSESSMENT — ENCOUNTER SYMPTOMS
DIARRHEA: 0
NAUSEA: 0
SHORTNESS OF BREATH: 0
COUGH: 0
SORE THROAT: 0
ABDOMINAL PAIN: 0
BACK PAIN: 0
VOMITING: 0

## 2021-10-30 ASSESSMENT — PAIN SCALES - GENERAL
PAINLEVEL_OUTOF10: 10
PAINLEVEL_OUTOF10: 10

## 2021-10-30 ASSESSMENT — PAIN DESCRIPTION - LOCATION: LOCATION: FINGER (COMMENT WHICH ONE)

## 2021-10-30 ASSESSMENT — PAIN DESCRIPTION - DESCRIPTORS: DESCRIPTORS: STABBING

## 2021-10-30 ASSESSMENT — PAIN DESCRIPTION - PAIN TYPE: TYPE: ACUTE PAIN

## 2021-10-30 NOTE — ED TRIAGE NOTES
Pt to ER after reporting he cut off tip of left thumb. Pt states he was using a table saw. Pt has remaining part of finger. Finger placed in saline soaked gauze and placed in bags on ice. Pt has towels on fingers. Bleeding controlled at this time.

## 2021-10-30 NOTE — ED PROVIDER NOTES
[facility] ED  eMERGENCY dEPARTMENT eNCOUnter      Pt Name: Jesus Pierce  MRN: [id number]  [place] 1960  Date of evaluation: 10/30/2021  Provider: Judah Keith MD        HISTORY OF PRESENT ILLNESS    Jesus Pierce is a 63 y.o. male per chart review has a DM II, HTN, hpl, kidney stones presents to the ED with L thumb amputation. Pt notes 5 min prior to arrival he was using a circular table saw and he amputated his L thumb. Pt notes moderate, constant, sharp, L thumb pain. Pt denies fever, n/v, cp, sob, ab pain, dysuria, diarrhea. REVIEW OF SYSTEMS       Review of Systems   Constitutional: Negative for activity change, chills and fever. HENT: Negative for ear pain and sore throat. Eyes: Negative for visual disturbance. Respiratory: Negative for cough and shortness of breath. Cardiovascular: Negative for chest pain, palpitations and leg swelling. Gastrointestinal: Negative for abdominal pain, diarrhea, nausea and vomiting. Genitourinary: Negative for dysuria. Musculoskeletal: Negative for back pain. L thumb amputation   Skin: Negative for rash. Neurological: Negative for dizziness and weakness. Except as noted above the remainder of the review of systems was reviewed and negative. PAST MEDICAL HISTORY     Past Medical History:   Diagnosis Date    Diabetes mellitus ([address]     Hyperlipidemia     Hypertension     Kidney stones     Neuropathy     Thyroid disease          SURGICAL HISTORY       Past Surgical History:   Procedure Laterality Date    BACK SURGERY      KNEE SURGERY Right          CURRENT MEDICATIONS       Previous Medications    ATORVASTATIN (LIPITOR) 40 MG TABLET    Take 40 mg by mouth daily    DULAGLUTIDE (TRULICITY SC)    Inject into the skin    GABAPENTIN (NEURONTIN) 600 MG TABLET    Take 600 mg by mouth 3 times daily. Michelle Galo     GLIPIZIDE (GLUCOTROL) 5 MG TABLET    Take 5 mg by mouth daily    LEVOTHYROXINE (SYNTHROID) 75 MCG TABLET    Take 75 mcg by mouth Daily    LOSARTAN (COZAAR) 25 MG TABLET    Take 25 mg by mouth daily    ONDANSETRON (ZOFRAN) 4 MG TABLET    Take 1 tablet by mouth every 8 hours as needed for Nausea or Vomiting    ONDANSETRON (ZOFRAN-ODT) 4 MG DISINTEGRATING TABLET    Take 1 tablet by mouth 3 times daily as needed for Nausea or Vomiting    PIOGLITAZONE (ACTOS) 45 MG TABLET    Take 45 mg by mouth daily    TAMSULOSIN (FLOMAX) 0.4 MG CAPSULE    Take 1 capsule by mouth daily    TRAMADOL (ULTRAM) 50 MG TABLET    Take 50 mg by mouth every 6 hours as needed for Pain. aNncy Ortega ALLERGIES     Patient has no known allergies. FAMILY HISTORY     History reviewed. No pertinent family history. SOCIAL HISTORY       Social History     Socioeconomic History    Marital status:      Spouse name: None    Number of children: None    Years of education: None    Highest education level: None   Occupational History    None   Tobacco Use    Smoking status: Former Smoker    Smokeless tobacco: Never Used   Vaping Use    Vaping Use: Never used   Substance and Sexual Activity    Alcohol use: No    Drug use: No    Sexual activity: None   Other Topics Concern    None   Social History Narrative    None     Social Determinants of Health     Financial Resource Strain:     Difficulty of Paying Living Expenses:    Food Insecurity:     Worried About Running Out of Food in the Last Year:     Ran Out of Food in the Last Year:    Transportation Needs:     Lack of Transportation (Medical):      Lack of Transportation (Non-Medical):    Physical Activity:     Days of Exercise per Week:     Minutes of Exercise per Session:    Stress:     Feeling of Stress :    Social Connections:     Frequency of Communication with Friends and Family:     Frequency of Social Gatherings with Friends and Family:     Attends Pentecostalism Services:     Active Member of Clubs or Organizations:     Attends Club or Organization Meetings:     Marital Status: Intimate Partner Violence:     Fear of Current or Ex-Partner:     Emotionally Abused:     Physically Abused:     Sexually Abused:          PHYSICAL EXAM        ED Triage Vitals [10/30/21 1513]   BP Temp Temp Source Pulse Resp SpO2 Height Weight   (!) 151/82 98 °F (36.7 °C) Oral 83 16 96 % 5' 11\" (1.803 m) 177 lb (80.3 kg)       Physical Exam  Vitals and nursing note reviewed. Constitutional:       Appearance: He is well-developed. HENT:      Head: Normocephalic. Right Ear: External ear normal.      Left Ear: External ear normal.   Eyes:      Conjunctiva/sclera: Conjunctivae normal.      Pupils: Pupils are equal, round, and reactive to light. Cardiovascular:      Rate and Rhythm: Normal rate and regular rhythm. Heart sounds: Normal heart sounds. Pulmonary:      Effort: Pulmonary effort is normal.      Breath sounds: Normal breath sounds. Abdominal:      General: Bowel sounds are normal. There is no distension. Palpations: Abdomen is soft. Tenderness: There is no abdominal tenderness. Musculoskeletal:         General: Normal range of motion. Cervical back: Normal range of motion and neck supple. Comments: L thumb amputation noted at L DIP. +Bone/tendon exposure. No active bleeding. Skin:     General: Skin is warm and dry. Neurological:      Mental Status: He is alert and oriented to person, place, and time.    Psychiatric:         Mood and Affect: Mood normal.           LABS:  Labs Reviewed   CBC WITH AUTO DIFFERENTIAL - Abnormal; Notable for the following components:       Result Value    Hematocrit 41.5 (*)     All other components within normal limits   COMPREHENSIVE METABOLIC PANEL   APTT   PROTIME-INR   TYPE AND SCREEN         MDM:   Vitals:    Vitals:    10/30/21 1513   BP: (!) 151/82   Pulse: 83   Resp: 16   Temp: 98 °F (36.7 °C)   TempSrc: Oral   SpO2: 96%   Weight: 177 lb (80.3 kg)   Height: 5' 11\" (1.803 m)       64 yo male presents to the ED with L thumb amputation. Pt is afebrile, hemodynamically stable. Pt given 1 L NS, IV dilaudid, IV zofran in the ED. Pt given IV ancef in the ED. Labs unremarkable. XR of thumb shows amputation at L IP joint. Pt given IM tetanus in the ED. Case discussed with Dr. Glynn Castleman (hand) and Dr. Quique Lee (hand) who recommended transfer for possible microsurgery. Pt and family educated about the results. Pt transferred to [facility] in serious condition. Pt accepted by Dr. Basilia Elias at [facility]. CRITICAL CARE TIME   Total CriticalCare time was 30 minutes, excluding separately reportable procedures. There was a high probability of clinically significant/life threatening deterioration in the patient's condition which required my urgent intervention. PROCEDURES:  Unlessotherwise noted below, none      Procedures      FINAL IMPRESSION      1.  Amputation of left thumb, initial encounter          DISPOSITION/PLAN   DISPOSITION Decision To Transfer 10/30/2021 03:46:52 PM          Dwayne Smyth MD (electronically signed)  Attending Emergency Physician          Dwayne Smyth MD  10/30/21 5502 Irvin Schafer MD  10/30/21 5502 Irvin Schafer MD  10/30/21 1602

## 2021-11-03 ENCOUNTER — APPOINTMENT (OUTPATIENT)
Dept: GENERAL RADIOLOGY | Age: 61
End: 2021-11-03
Attending: ORTHOPAEDIC SURGERY
Payer: COMMERCIAL

## 2021-11-03 ENCOUNTER — ANESTHESIA EVENT (OUTPATIENT)
Dept: OPERATING ROOM | Age: 61
End: 2021-11-03
Payer: COMMERCIAL

## 2021-11-03 ENCOUNTER — HOSPITAL ENCOUNTER (OUTPATIENT)
Age: 61
Setting detail: OUTPATIENT SURGERY
Discharge: HOME | End: 2021-11-03
Attending: ORTHOPAEDIC SURGERY | Admitting: ORTHOPAEDIC SURGERY
Payer: COMMERCIAL

## 2021-11-03 ENCOUNTER — ANESTHESIA (OUTPATIENT)
Dept: OPERATING ROOM | Age: 61
End: 2021-11-03
Payer: COMMERCIAL

## 2021-11-03 VITALS
DIASTOLIC BLOOD PRESSURE: 86 MMHG | BODY MASS INDEX: 24.78 KG/M2 | HEART RATE: 79 BPM | WEIGHT: 177 LBS | RESPIRATION RATE: 16 BRPM | SYSTOLIC BLOOD PRESSURE: 142 MMHG | TEMPERATURE: 48.2 F | HEIGHT: 71 IN | OXYGEN SATURATION: 99 %

## 2021-11-03 VITALS — OXYGEN SATURATION: 97 % | DIASTOLIC BLOOD PRESSURE: 66 MMHG | SYSTOLIC BLOOD PRESSURE: 129 MMHG

## 2021-11-03 LAB
GLUCOSE BLD-MCNC: 222 MG/DL (ref 60–115)
PERFORMED ON: ABNORMAL

## 2021-11-03 PROCEDURE — 3700000000 HC ANESTHESIA ATTENDED CARE: Performed by: ORTHOPAEDIC SURGERY

## 2021-11-03 PROCEDURE — 6360000002 HC RX W HCPCS: Performed by: ORTHOPAEDIC SURGERY

## 2021-11-03 PROCEDURE — 2709999900 HC NON-CHARGEABLE SUPPLY: Performed by: ORTHOPAEDIC SURGERY

## 2021-11-03 PROCEDURE — 3700000001 HC ADD 15 MINUTES (ANESTHESIA): Performed by: ORTHOPAEDIC SURGERY

## 2021-11-03 PROCEDURE — 2500000003 HC RX 250 WO HCPCS: Performed by: ORTHOPAEDIC SURGERY

## 2021-11-03 PROCEDURE — 3209999900 FLUORO FOR SURGICAL PROCEDURES

## 2021-11-03 PROCEDURE — 3600000012 HC SURGERY LEVEL 2 ADDTL 15MIN: Performed by: ORTHOPAEDIC SURGERY

## 2021-11-03 PROCEDURE — 2580000003 HC RX 258: Performed by: ORTHOPAEDIC SURGERY

## 2021-11-03 PROCEDURE — 3600000002 HC SURGERY LEVEL 2 BASE: Performed by: ORTHOPAEDIC SURGERY

## 2021-11-03 RX ORDER — FENTANYL CITRATE 50 UG/ML
50 INJECTION, SOLUTION INTRAMUSCULAR; INTRAVENOUS EVERY 10 MIN PRN
Status: DISCONTINUED | OUTPATIENT
Start: 2021-11-03 | End: 2021-11-03 | Stop reason: HOSPADM

## 2021-11-03 RX ORDER — HYDROCODONE BITARTRATE AND ACETAMINOPHEN 5; 325 MG/1; MG/1
2 TABLET ORAL PRN
Status: DISCONTINUED | OUTPATIENT
Start: 2021-11-03 | End: 2021-11-03 | Stop reason: HOSPADM

## 2021-11-03 RX ORDER — METOCLOPRAMIDE HYDROCHLORIDE 5 MG/ML
10 INJECTION INTRAMUSCULAR; INTRAVENOUS
Status: DISCONTINUED | OUTPATIENT
Start: 2021-11-03 | End: 2021-11-03 | Stop reason: HOSPADM

## 2021-11-03 RX ORDER — HYDROCODONE BITARTRATE AND ACETAMINOPHEN 5; 325 MG/1; MG/1
1 TABLET ORAL PRN
Status: DISCONTINUED | OUTPATIENT
Start: 2021-11-03 | End: 2021-11-03 | Stop reason: HOSPADM

## 2021-11-03 RX ORDER — MEPERIDINE HYDROCHLORIDE 25 MG/ML
12.5 INJECTION INTRAMUSCULAR; INTRAVENOUS; SUBCUTANEOUS EVERY 5 MIN PRN
Status: DISCONTINUED | OUTPATIENT
Start: 2021-11-03 | End: 2021-11-03 | Stop reason: HOSPADM

## 2021-11-03 RX ORDER — DIPHENHYDRAMINE HYDROCHLORIDE 50 MG/ML
12.5 INJECTION INTRAMUSCULAR; INTRAVENOUS
Status: DISCONTINUED | OUTPATIENT
Start: 2021-11-03 | End: 2021-11-03 | Stop reason: HOSPADM

## 2021-11-03 RX ORDER — BUPIVACAINE HYDROCHLORIDE 5 MG/ML
30 INJECTION, SOLUTION EPIDURAL; INTRACAUDAL ONCE
Status: DISCONTINUED | OUTPATIENT
Start: 2021-11-03 | End: 2021-11-03 | Stop reason: HOSPADM

## 2021-11-03 RX ORDER — LIDOCAINE HYDROCHLORIDE 10 MG/ML
10 INJECTION, SOLUTION EPIDURAL; INFILTRATION; INTRACAUDAL; PERINEURAL ONCE
Status: COMPLETED | OUTPATIENT
Start: 2021-11-03 | End: 2021-11-03

## 2021-11-03 RX ORDER — MAGNESIUM HYDROXIDE 1200 MG/15ML
LIQUID ORAL CONTINUOUS PRN
Status: COMPLETED | OUTPATIENT
Start: 2021-11-03 | End: 2021-11-03

## 2021-11-03 RX ORDER — SODIUM CHLORIDE, SODIUM LACTATE, POTASSIUM CHLORIDE, CALCIUM CHLORIDE 600; 310; 30; 20 MG/100ML; MG/100ML; MG/100ML; MG/100ML
INJECTION, SOLUTION INTRAVENOUS CONTINUOUS
Status: DISCONTINUED | OUTPATIENT
Start: 2021-11-03 | End: 2021-11-03 | Stop reason: HOSPADM

## 2021-11-03 RX ORDER — ASPIRIN 81 MG/1
81 TABLET ORAL DAILY
COMMUNITY

## 2021-11-03 RX ORDER — ONDANSETRON 2 MG/ML
4 INJECTION INTRAMUSCULAR; INTRAVENOUS
Status: DISCONTINUED | OUTPATIENT
Start: 2021-11-03 | End: 2021-11-03 | Stop reason: HOSPADM

## 2021-11-03 RX ADMIN — SODIUM CHLORIDE, POTASSIUM CHLORIDE, SODIUM LACTATE AND CALCIUM CHLORIDE 100 ML/HR: 600; 310; 30; 20 INJECTION, SOLUTION INTRAVENOUS at 07:11

## 2021-11-03 RX ADMIN — LIDOCAINE HYDROCHLORIDE 10 ML: 10 INJECTION, SOLUTION EPIDURAL; INFILTRATION; INTRACAUDAL; PERINEURAL at 07:10

## 2021-11-03 RX ADMIN — CEFAZOLIN 2000 MG: 10 INJECTION, POWDER, FOR SOLUTION INTRAVENOUS at 07:30

## 2021-11-03 ASSESSMENT — PULMONARY FUNCTION TESTS
PIF_VALUE: 0
PIF_VALUE: 1
PIF_VALUE: 0
PIF_VALUE: 1
PIF_VALUE: 0
PIF_VALUE: 1
PIF_VALUE: 0
PIF_VALUE: 1
PIF_VALUE: 0
PIF_VALUE: 1
PIF_VALUE: 0

## 2021-11-03 ASSESSMENT — PAIN - FUNCTIONAL ASSESSMENT: PAIN_FUNCTIONAL_ASSESSMENT: 0-10

## 2021-11-03 NOTE — OP NOTE
Operative Note      Patient: Dann Cunha  YOB: 1960  MRN: [id number]    Date of Procedure: 11/3/2021      Preoperative diagnosis: Traumatic amputation through distal phalanx left thumb    Postoperative diagnosis: Same     Procedure planned: Revision amputation to left thumb distal phalanx with primary closure    Procedure performed: Revision of amputation to left thumb through interphalangeal joint with primary closure    Surgeon: Joss Arshad D.O. Assistant: KASH Mathis  The physician assistant was present through the entire case. Given the nature of the disease process and the procedure to be performed a skilled surgical assistant was necessary during the case. The assistant was necessary in order to hold retractors and directly assist in the operation. A certified scrub tech was at the back table managing instruments and supplies for the surgical case. Anesthesia: Digital block monitored by the anesthesia team    Estimated blood loss: Less than 10 cc    Drains: None    Tourniquet: Turnicot for approximately 15 minutes    Specimens: None    Implants: None    Indications: The patient presented to the office status post table saw injury to the left thumb. He amputated the tip of his left thumb in a dorsal oblique fashion passing the sawblade through the IP joint and removing most of the bone stock from the distal phalanx. Treatment options were discussed. Recommendations were made for revision of amputation. We explained that given the severe destruction to the interphalangeal joint we would not be trying to salvage that. We explained we would like to preserve as much length as possible but also to give him a reliable closure. After full discussion regarding risks benefits and alternatives the patient elected to proceed forth with surgery by way of revision of amputation. Informed consent was signed and placed in the chart.     Complications: None noted at the time of surgery Description of operation: The patient was taken to the operative suite and placed in the supine position on the operating table. A timeout was performed and the left thumb confirmed to be the operative site. He was carefully positioned on the table in such a fashion as to pad all bony prominences and peripheral nerves. He was administered appropriate IV biotics. The digital block was working well. He was prepped and draped in the normal sterile fashion. A turnicot was placed at the base of the left thumb. The wound was then explored. Sutures placed in the emergency department were removed. The laceration carried down through the dorsal skin proximal to the germinal structures of the nail to passed through the distal dorsal aspect of the proximal phalanx destroying the interphalangeal joint and removing the vast majority of the distal phalanx. The remaining portion of the distal phalanx was comminuted and it was deemed most appropriate to simply excise that portion of bone which was less than 1 cm in longitudinal length. In doing so we would be able to perform a supple soft tissue closure. The soft tissue envelope was debrided with combination of tenotomy scissors and 15 blade removing nonviable skin edges and subcutaneous tissues. Neurectomies were performed both radially and ulnarly to the neurovascular bundles. The rongeur was then used to remove the remaining portion of the distal phalanx with some help from the 15 blade. The distal portion of the proximal phalanx was then contoured with a rongeur to avoid any problems with sharp bony prominence. Copious irrigation was then performed running approximately 3 L of normal sterile saline through the open fracture. The turnicot was relieved and hemostasis achieved. Interrupted nylon sutures were then used to close the skin. A bulky soft dressing was placed. The patient was allowed to head to recovery in stable condition.   Overall he tolerated

## 2021-11-03 NOTE — ANESTHESIA PRE PROCEDURE
Department of Anesthesiology  Preprocedure Note       Name:  Augusto Gaitan   Age:  63 y.o.  :  1960                                          MRN:  [id number]         Date:  11/3/2021      Surgeon: Bryn Query):  Kaylee Durant DO    Procedure: Procedure(s):  LEFT THUMB REVISION OF AMPUTATION DIGITAL BLOCK, SUPINE C-ARM; 3L SALINE & CYSTO TUBING; 5.0 CHROMIC LOCAL ONLY, MAC    Medications prior to admission:   Prior to Admission medications    Medication Sig Start Date End Date Taking? Authorizing Provider   aspirin 81 MG EC tablet Take 81 mg by mouth daily   Yes Historical Provider, MD   Dulaglutide (TRULICITY SC) Inject into the skin   Yes Historical Provider, MD   levothyroxine (SYNTHROID) 75 MCG tablet Take 75 mcg by mouth Daily   Yes Historical Provider, MD   atorvastatin (LIPITOR) 40 MG tablet Take 40 mg by mouth daily   Yes Historical Provider, MD   gabapentin (NEURONTIN) 600 MG tablet Take 600 mg by mouth 3 times daily. .   Yes Historical Provider, MD   traMADol (ULTRAM) 50 MG tablet Take 50 mg by mouth every 6 hours as needed for Pain. Nancy Almonte     Historical Provider, MD       Current medications:    Current Facility-Administered Medications   Medication Dose Route Frequency Provider Last Rate Last Admin    ceFAZolin (ANCEF) 2000 mg in dextrose 5 % 100 mL IVPB  2,000 mg IntraVENous On Call to Onofre Escalante DO        lactated ringers infusion   IntraVENous Continuous Alphonso Hernandez DO        meperidine (DEMEROL) injection 12.5 mg  12.5 mg IntraVENous Q5 Min PRN Artie Alexander MD        fentaNYL (SUBLIMAZE) injection 50 mcg  50 mcg IntraVENous Q10 Min PRN Artie Alexander MD        HYDROmorphone (DILAUDID) injection 0.5 mg  0.5 mg IntraVENous Q10 Min PRN Artie Alexander MD        HYDROcodone-acetaminophen [facility]) 5-325 MG per tablet 1 tablet  1 tablet Oral PRN Artie Alexander MD        Or    HYDROcodone-acetaminophen (NORCO) 5-325 MG per tablet 2 tablet  2 tablet Oral PRN Vladimir Davis MD        ondansetron [facility]) injection 4 mg  4 mg IntraVENous Once PRN Vladimir Davis MD        metoclopramide [facility]) injection 10 mg  10 mg IntraVENous Once PRN Vladimir Davis MD        diphenhydrAMINE (BENADRYL) injection 12.5 mg  12.5 mg IntraVENous Once PRN Vladimir Davis MD           Allergies:  No Known Allergies    Problem List:  There is no problem list on file for this patient. Past Medical History:        Diagnosis Date    Diabetes mellitus ([address]     Hyperlipidemia     Hypertension     Kidney stones     Neuropathy     Thyroid disease        Past Surgical History:        Procedure Laterality Date    BACK SURGERY      KNEE SURGERY Right        Social History:    Social History     Tobacco Use    Smoking status: Former Smoker    Smokeless tobacco: Never Used   Substance Use Topics    Alcohol use: No                                Counseling given: Not Answered      Vital Signs (Current):   Vitals:    11/03/21 0640 11/03/21 0643   BP:  (!) 142/86   Pulse:  79   Resp:  16   Temp: 98.4 °F (36.9 °C) (!) 48.2 °F (9 °C)   SpO2:  99%   Weight:  177 lb (80.3 kg)   Height:  5' 11\" (1.803 m)                                              BP Readings from Last 3 Encounters:   11/03/21 (!) 142/86   10/30/21 (!) 113/97   08/27/20 (!) 157/73       NPO Status:                                                                                 BMI:   Wt Readings from Last 3 Encounters:   11/03/21 177 lb (80.3 kg)   10/30/21 177 lb (80.3 kg)   08/27/20 195 lb (88.5 kg)     Body mass index is 24.69 kg/m².     CBC:   Lab Results   Component Value Date    WBC 9.3 10/30/2021    RBC 4.88 10/30/2021    HGB 14.5 10/30/2021    HCT 41.5 10/30/2021    MCV 85.0 10/30/2021    RDW 12.6 10/30/2021     10/30/2021       CMP:   Lab Results   Component Value Date     10/30/2021    K 3.0 10/30/2021    CL 97 10/30/2021    CO2 30 10/30/2021    BUN 16 10/30/2021 CREATININE 0.66 10/30/2021    GFRAA >60.0 10/30/2021    LABGLOM >60.0 10/30/2021    GLUCOSE 213 10/30/2021    PROT 6.7 10/30/2021    CALCIUM 8.8 10/30/2021    BILITOT 0.6 10/30/2021    ALKPHOS 72 10/30/2021    AST 28 10/30/2021    ALT 37 10/30/2021       POC Tests: No results for input(s): POCGLU, POCNA, POCK, POCCL, POCBUN, POCHEMO, POCHCT in the last 72 hours. Coags:   Lab Results   Component Value Date    PROTIME 13.0 10/30/2021    INR 1.0 10/30/2021    APTT 25.2 10/30/2021       HCG (If Applicable): No results found for: PREGTESTUR, PREGSERUM, HCG, HCGQUANT     ABGs: No results found for: PHART, PO2ART, SBH9IAN, FBP4SCM, BEART, U8BWPDUU     Type & Screen (If Applicable):  No results found for: LABABO, LABRH    Drug/Infectious Status (If Applicable):  No results found for: HIV, HEPCAB    COVID-19 Screening (If Applicable): No results found for: COVID19        Anesthesia Evaluation  Patient summary reviewed and Nursing notes reviewed no history of anesthetic complications:   Airway: Mallampati: II  TM distance: >3 FB   Neck ROM: full  Mouth opening: > = 3 FB Dental: normal exam         Pulmonary:Negative Pulmonary ROS and normal exam                               Cardiovascular:    (+) hypertension: no interval change,       ECG reviewed                        Neuro/Psych:   Negative Neuro/Psych ROS              GI/Hepatic/Renal: Neg GI/Hepatic/Renal ROS            Endo/Other:    (+) Diabetes, . Abdominal:             Vascular: negative vascular ROS. Other Findings:             Anesthesia Plan      MAC     ASA 3             Anesthetic plan and risks discussed with patient. Plan discussed with CRNA.     Attending anesthesiologist reviewed and agrees with Preprocedure content              Aimee Gomez MD   11/3/2021

## 2023-04-05 DIAGNOSIS — E03.9 HYPOTHYROIDISM, UNSPECIFIED TYPE: Primary | ICD-10-CM

## 2023-04-05 DIAGNOSIS — I10 HYPERTENSION, UNSPECIFIED TYPE: ICD-10-CM

## 2023-04-05 RX ORDER — TRIAMTERENE AND HYDROCHLOROTHIAZIDE 37.5; 25 MG/1; MG/1
CAPSULE ORAL
Qty: 90 CAPSULE | Refills: 3 | Status: SHIPPED | OUTPATIENT
Start: 2023-04-05 | End: 2024-04-01

## 2023-04-05 RX ORDER — LEVOTHYROXINE SODIUM 75 UG/1
TABLET ORAL
Qty: 90 TABLET | Refills: 3 | Status: SHIPPED | OUTPATIENT
Start: 2023-04-05 | End: 2024-04-01

## 2023-04-24 DIAGNOSIS — E78.5 HYPERLIPIDEMIA, UNSPECIFIED HYPERLIPIDEMIA TYPE: Primary | ICD-10-CM

## 2023-04-24 RX ORDER — ATORVASTATIN CALCIUM 40 MG/1
TABLET, FILM COATED ORAL
Qty: 90 TABLET | Refills: 3 | Status: SHIPPED | OUTPATIENT
Start: 2023-04-24 | End: 2024-04-18

## 2023-04-28 ENCOUNTER — OFFICE VISIT (OUTPATIENT)
Dept: PRIMARY CARE | Facility: CLINIC | Age: 63
End: 2023-04-28
Payer: COMMERCIAL

## 2023-04-28 VITALS
SYSTOLIC BLOOD PRESSURE: 129 MMHG | DIASTOLIC BLOOD PRESSURE: 77 MMHG | HEIGHT: 71 IN | WEIGHT: 186 LBS | OXYGEN SATURATION: 94 % | BODY MASS INDEX: 26.04 KG/M2 | HEART RATE: 71 BPM

## 2023-04-28 DIAGNOSIS — Z13.9 SCREENING FOR CONDITION: ICD-10-CM

## 2023-04-28 DIAGNOSIS — Z79.899 DRUG THERAPY: ICD-10-CM

## 2023-04-28 DIAGNOSIS — E55.9 VITAMIN D DEFICIENCY: ICD-10-CM

## 2023-04-28 DIAGNOSIS — Z79.4 TYPE 2 DIABETES MELLITUS WITH DIABETIC POLYNEUROPATHY, WITH LONG-TERM CURRENT USE OF INSULIN (MULTI): ICD-10-CM

## 2023-04-28 DIAGNOSIS — E78.5 HYPERLIPIDEMIA, UNSPECIFIED HYPERLIPIDEMIA TYPE: ICD-10-CM

## 2023-04-28 DIAGNOSIS — E11.42 TYPE 2 DIABETES MELLITUS WITH DIABETIC POLYNEUROPATHY, WITH LONG-TERM CURRENT USE OF INSULIN (MULTI): ICD-10-CM

## 2023-04-28 DIAGNOSIS — E03.9 HYPOTHYROIDISM, UNSPECIFIED TYPE: ICD-10-CM

## 2023-04-28 DIAGNOSIS — Z00.00 PREVENTATIVE HEALTH CARE: Primary | ICD-10-CM

## 2023-04-28 LAB — POC HEMOGLOBIN A1C: 6.2 % (ref 4.2–6.5)

## 2023-04-28 PROCEDURE — 3074F SYST BP LT 130 MM HG: CPT | Performed by: FAMILY MEDICINE

## 2023-04-28 PROCEDURE — 83036 HEMOGLOBIN GLYCOSYLATED A1C: CPT | Performed by: FAMILY MEDICINE

## 2023-04-28 PROCEDURE — 3078F DIAST BP <80 MM HG: CPT | Performed by: FAMILY MEDICINE

## 2023-04-28 PROCEDURE — 99396 PREV VISIT EST AGE 40-64: CPT | Performed by: FAMILY MEDICINE

## 2023-04-28 PROCEDURE — 99214 OFFICE O/P EST MOD 30 MIN: CPT | Performed by: FAMILY MEDICINE

## 2023-04-28 NOTE — PATIENT INSTRUCTIONS
Patient Discussion/Summary     Annual wellness visit today with in office A1c check and controlled substance refill appointment.     -----  Need for immunizations: Has completed the shingrix series an had Tdap 2020. Is also up-to-date on annual annual flu shot. Patient has completed his coronavirus immunization series including bivalent booster. -->>  Keep up the good work staying up-to-date on your immunizations.     Screen for prostate cancer, psa 0.51. -->> We will recheck annually as previously discussed.  Colon cancer screening -->> patient has his Cologuard test at home, which he will send in before the end of August.   screen for hepatitis C negative August 2022.   ----  BMI 25, up 6 pounds since last appointment here, still down from max of 215. -->> Keep up the good overall work.     Low back pain, on tramadol. Is being seen for controlled substance refill appointment today.  OAR[place] report reviewed in the other system.  Will refill tramadol as needed.  New contract and UDS due around August.-->> Will refill as needed.     Cardiac CT score is 585, patient stress test with cardiology as inconclusive, they were planning to do a nuclear stress test but it has not been done yet. Patient is discussing with cardiology, they are saying not to do his stress test until COVID improves. -->> Patient is going to contact cardiology/Dr Cooper to find out if you are due for any appointments or tests.     Diabetic neuropathy, usually doing well enough on 1200 mg 3 times a day of Neurontin. Failed pregabalin/Lyrica (did not work as well as АНДРЕЙ). -->> we will refill gabapentin as needed.      New a1c reviewed:  - Type 2 diabetes, A1C 6.2, was 5.7, 6.2, 10.6, 10.3, 7.2, 7.5, 7.5, 7.6, 8.6, 8.5, 6.0 (calc from fructosamine). Patient has improved diet and is on and miglitol and Trulicity 4.5 mg / week injections as well as farxiga. Failed Metformin (diarrhea). Currently on 40 units daily of long-acting insulin. No longer having  low sugar events. -->>  Will refill meds as needed.  Recheck A1c in 3 to 4 months.      Regarding previous labs:  - Hyperlipidemia, HDL 47, was 51, 41, 42, 43, 43, 42, 47, goal is 45 or more so that's close. LDL of 37, was 98, 54, 56, 67, 73, 68, 82, your goal is less than 70. On atorvastatin 40,(was elevated at 1 time as patient had to hold 5 days of pills while on paxlovid). -->> Continue current treatment, will recheck with next annual labs.  - Drug therapy. -->> next annual labs around aug 2023.  - Vitamin D deficiency, 71, was 87, 94, 83, 64, 51, 36, 35. Goal is 75 - 100. Is now doing 5,000 units, 2 daily vitamin D3 5 or 6 days a week. However has been missing some doses recently. -->> Continue current treatment, be sure to increase back to your regular dosing. We will recheck with next annual labs  - Hypothyroidism, TSH 1.55, was 0.87, 1.53, 0.74, FT3 and FT4 WNL. Euthyroid on 75 mcg daily. -->> Continue current 75 Âµg daily. We'll recheck with next annual labs.   - h/o Dehydrated, is again dehydrated on these labs. Continue to drink plenty of water to help stay well-hydrated.  - Hypokalemia. 3.6, was 3.7, 3.5, 3.3. Patient is eating more potassium-rich foods. -->> Continue potassium-rich foods.      Hypertension, on Dyazide and losartan, blood pressure grossly WNL today, Patient does checked fairly regularly and gets good numbers at rest. -->> Continue current treatment.        - Return in 4 months, on a Friday, for annual lab review and controlled substance contract and UDS.  -Here about a week before the appointment to get fasting annual labs including a thyroid panel drawn.

## 2023-04-28 NOTE — PROGRESS NOTES
Pt in today for routine 4 month FU / IO A1c    Subjective   Patient ID: Polo Drew is a 63 y.o. male who presents for 4 month FU / IO A1c (Pt in today for routine 4 month FU / IO A1c).    Review of Systems  Denies N/V/D/C, no HA/S/V, denies rashes/lesions, no CP/SOB. Denies fevers/chills.  All other systems were negative.    Objective   Physical Exam  Gen: NAD  eyes: EOMI, PERRLA  ENT: hearing grossly intact, no nasal discharge  resp: CTABL, without R/R  heart: RRR without MRG  GI: abd: S/ND/NT, BS+  lymph: no axillary, cervical, supraclavicular lymphadenopathy noted   MS: gait grossly WNL,  derm: no rashes or lesions noted  neuro: CN II-XII grossly intact  psych: A&Ox3    Assessment/Plan            Patient Discussion/Summary     Annual wellness visit today with in office A1c check and controlled substance refill appointment.     -----  Need for immunizations: Has completed the shingrix series an had Tdap 2020. Is also up-to-date on annual annual flu shot. Patient has completed his coronavirus immunization series including bivalent booster. -->>  Keep up the good work staying up-to-date on your immunizations.     Screen for prostate cancer, psa 0.51. -->> We will recheck annually as previously discussed.  Colon cancer screening -->> patient has his Cologuard test at home, which he will send in before the end of August.   screen for hepatitis C negative August 2022.   ----  BMI 25, up 6 pounds since last appointment here, still down from max of 215. -->> Keep up the good overall work.     Low back pain, on tramadol. Is being seen for controlled substance refill appointment today.  OARRS report reviewed in the other system.  Will refill tramadol as needed.  New contract and UDS due around August.-->> Will refill as needed.     Cardiac CT score is 585, patient stress test with cardiology as inconclusive, they were planning to do a nuclear stress test but it has not been done yet. Patient is discussing with  cardiology, they are saying not to do his stress test until COVID improves. -->> Patient is going to contact cardiology/Dr Cooper to find out if you are due for any appointments or tests.     Diabetic neuropathy, usually doing well enough on 1200 mg 3 times a day of Neurontin. Failed pregabalin/Lyrica (did not work as well as АНДРЕЙ). -->> we will refill gabapentin as needed.      New a1c reviewed:  - Type 2 diabetes, A1C 6.2, was 5.7, 6.2, 10.6, 10.3, 7.2, 7.5, 7.5, 7.6, 8.6, 8.5, 6.0 (calc from fructosamine). Patient has improved diet and is on and miglitol and Trulicity 4.5 mg / week injections as well as farxiga. Failed Metformin (diarrhea). Currently on 40 units daily of long-acting insulin. No longer having low sugar events. -->>  Will refill meds as needed.  Recheck A1c in 3 to 4 months.      Regarding previous labs:  - Hyperlipidemia, HDL 47, was 51, 41, 42, 43, 43, 42, 47, goal is 45 or more so that's close. LDL of 37, was 98, 54, 56, 67, 73, 68, 82, your goal is less than 70. On atorvastatin 40,(was elevated at 1 time as patient had to hold 5 days of pills while on paxlovid). -->> Continue current treatment, will recheck with next annual labs.  - Drug therapy. -->> next annual labs around aug 2023.  - Vitamin D deficiency, 71, was 87, 94, 83, 64, 51, 36, 35. Goal is 75 - 100. Is now doing 5,000 units, 2 daily vitamin D3 5 or 6 days a week. However has been missing some doses recently. -->> Continue current treatment, be sure to increase back to your regular dosing. We will recheck with next annual labs  - Hypothyroidism, TSH 1.55, was 0.87, 1.53, 0.74, FT3 and FT4 WNL. Euthyroid on 75 mcg daily. -->> Continue current 75 Âµg daily. We'll recheck with next annual labs.   - h/o Dehydrated, is again dehydrated on these labs. Continue to drink plenty of water to help stay well-hydrated.  - Hypokalemia. 3.6, was 3.7, 3.5, 3.3. Patient is eating more potassium-rich foods. -->> Continue potassium-rich foods.       Hypertension, on Dyazide and losartan, blood pressure grossly WNL today, Patient does checked fairly regularly and gets good numbers at rest. -->> Continue current treatment.        - Return in 4 months, on a Friday, for annual lab review and controlled substance contract and UDS.  -Here about a week before the appointment to get fasting annual labs including a thyroid panel drawn.

## 2023-05-17 DIAGNOSIS — Z79.4 TYPE 2 DIABETES MELLITUS WITH DIABETIC POLYNEUROPATHY, WITH LONG-TERM CURRENT USE OF INSULIN (MULTI): Primary | ICD-10-CM

## 2023-05-17 DIAGNOSIS — E11.42 TYPE 2 DIABETES MELLITUS WITH DIABETIC POLYNEUROPATHY, WITH LONG-TERM CURRENT USE OF INSULIN (MULTI): Primary | ICD-10-CM

## 2023-05-18 RX ORDER — PEN NEEDLE, DIABETIC 32GX 5/32"
NEEDLE, DISPOSABLE MISCELLANEOUS
Qty: 200 EACH | Refills: 3 | Status: SHIPPED | OUTPATIENT
Start: 2023-05-18 | End: 2023-12-13

## 2023-06-23 DIAGNOSIS — G89.29 OTHER CHRONIC PAIN: Primary | ICD-10-CM

## 2023-06-26 RX ORDER — TRAMADOL HYDROCHLORIDE 50 MG/1
TABLET ORAL
Qty: 180 TABLET | Refills: 1 | Status: SHIPPED | OUTPATIENT
Start: 2023-06-26 | End: 2023-08-25 | Stop reason: SDUPTHER

## 2023-08-18 ENCOUNTER — CLINICAL SUPPORT (OUTPATIENT)
Dept: PRIMARY CARE | Facility: CLINIC | Age: 63
End: 2023-08-18
Payer: COMMERCIAL

## 2023-08-18 DIAGNOSIS — E11.42 TYPE 2 DIABETES MELLITUS WITH DIABETIC POLYNEUROPATHY, WITH LONG-TERM CURRENT USE OF INSULIN (MULTI): ICD-10-CM

## 2023-08-18 DIAGNOSIS — Z79.4 TYPE 2 DIABETES MELLITUS WITH DIABETIC POLYNEUROPATHY, WITH LONG-TERM CURRENT USE OF INSULIN (MULTI): ICD-10-CM

## 2023-08-18 DIAGNOSIS — Z79.899 DRUG THERAPY: ICD-10-CM

## 2023-08-18 DIAGNOSIS — E55.9 VITAMIN D DEFICIENCY: ICD-10-CM

## 2023-08-18 DIAGNOSIS — E03.9 HYPOTHYROIDISM, UNSPECIFIED TYPE: ICD-10-CM

## 2023-08-18 LAB
ALANINE AMINOTRANSFERASE (SGPT) (U/L) IN SER/PLAS: 19 U/L (ref 10–52)
ALBUMIN (G/DL) IN SER/PLAS: 4.1 G/DL (ref 3.4–5)
ALKALINE PHOSPHATASE (U/L) IN SER/PLAS: 67 U/L (ref 33–136)
ANION GAP IN SER/PLAS: 12 MMOL/L (ref 10–20)
APPEARANCE, URINE: CLEAR
ASPARTATE AMINOTRANSFERASE (SGOT) (U/L) IN SER/PLAS: 22 U/L (ref 9–39)
BASOPHILS (10*3/UL) IN BLOOD BY AUTOMATED COUNT: 0.05 X10E9/L (ref 0–0.1)
BASOPHILS/100 LEUKOCYTES IN BLOOD BY AUTOMATED COUNT: 0.5 % (ref 0–2)
BILIRUBIN TOTAL (MG/DL) IN SER/PLAS: 0.6 MG/DL (ref 0–1.2)
BILIRUBIN, URINE: NEGATIVE
BLOOD, URINE: NEGATIVE
CALCIDIOL (25 OH VITAMIN D3) (NG/ML) IN SER/PLAS: 84 NG/ML
CALCIUM (MG/DL) IN SER/PLAS: 9 MG/DL (ref 8.6–10.3)
CARBON DIOXIDE, TOTAL (MMOL/L) IN SER/PLAS: 33 MMOL/L (ref 21–32)
CHLORIDE (MMOL/L) IN SER/PLAS: 98 MMOL/L (ref 98–107)
CHOLESTEROL (MG/DL) IN SER/PLAS: 128 MG/DL (ref 0–199)
CHOLESTEROL IN HDL (MG/DL) IN SER/PLAS: 46.7 MG/DL
CHOLESTEROL/HDL RATIO: 2.7
COLOR, URINE: YELLOW
CREATININE (MG/DL) IN SER/PLAS: 0.69 MG/DL (ref 0.5–1.3)
EOSINOPHILS (10*3/UL) IN BLOOD BY AUTOMATED COUNT: 0.16 X10E9/L (ref 0–0.7)
EOSINOPHILS/100 LEUKOCYTES IN BLOOD BY AUTOMATED COUNT: 1.7 % (ref 0–6)
ERYTHROCYTE DISTRIBUTION WIDTH (RATIO) BY AUTOMATED COUNT: 12.5 % (ref 11.5–14.5)
ERYTHROCYTE MEAN CORPUSCULAR HEMOGLOBIN CONCENTRATION (G/DL) BY AUTOMATED: 32.7 G/DL (ref 32–36)
ERYTHROCYTE MEAN CORPUSCULAR VOLUME (FL) BY AUTOMATED COUNT: 92 FL (ref 80–100)
ERYTHROCYTES (10*6/UL) IN BLOOD BY AUTOMATED COUNT: 4.65 X10E12/L (ref 4.5–5.9)
ESTIMATED AVERAGE GLUCOSE FOR HBA1C: 146 MG/DL
GFR MALE: >90 ML/MIN/1.73M2
GLUCOSE (MG/DL) IN SER/PLAS: 93 MG/DL (ref 74–99)
GLUCOSE, URINE: NEGATIVE MG/DL
HEMATOCRIT (%) IN BLOOD BY AUTOMATED COUNT: 42.8 % (ref 41–52)
HEMOGLOBIN (G/DL) IN BLOOD: 14 G/DL (ref 13.5–17.5)
HEMOGLOBIN A1C/HEMOGLOBIN TOTAL IN BLOOD: 6.7 %
IMMATURE GRANULOCYTES/100 LEUKOCYTES IN BLOOD BY AUTOMATED COUNT: 0.4 % (ref 0–0.9)
KETONES, URINE: NEGATIVE MG/DL
LDL: 61 MG/DL (ref 0–99)
LEUKOCYTE ESTERASE, URINE: NEGATIVE
LEUKOCYTES (10*3/UL) IN BLOOD BY AUTOMATED COUNT: 9.7 X10E9/L (ref 4.4–11.3)
LYMPHOCYTES (10*3/UL) IN BLOOD BY AUTOMATED COUNT: 3.1 X10E9/L (ref 1.2–4.8)
LYMPHOCYTES/100 LEUKOCYTES IN BLOOD BY AUTOMATED COUNT: 32 % (ref 13–44)
MAGNESIUM (MG/DL) IN SER/PLAS: 2.31 MG/DL (ref 1.6–2.4)
MONOCYTES (10*3/UL) IN BLOOD BY AUTOMATED COUNT: 0.94 X10E9/L (ref 0.1–1)
MONOCYTES/100 LEUKOCYTES IN BLOOD BY AUTOMATED COUNT: 9.7 % (ref 2–10)
NEUTROPHILS (10*3/UL) IN BLOOD BY AUTOMATED COUNT: 5.4 X10E9/L (ref 1.2–7.7)
NEUTROPHILS/100 LEUKOCYTES IN BLOOD BY AUTOMATED COUNT: 55.7 % (ref 40–80)
NITRITE, URINE: NEGATIVE
PH, URINE: 7 (ref 5–8)
PLATELETS (10*3/UL) IN BLOOD AUTOMATED COUNT: 308 X10E9/L (ref 150–450)
POTASSIUM (MMOL/L) IN SER/PLAS: 3.7 MMOL/L (ref 3.5–5.3)
PROTEIN TOTAL: 6.8 G/DL (ref 6.4–8.2)
PROTEIN, URINE: NEGATIVE MG/DL
SODIUM (MMOL/L) IN SER/PLAS: 139 MMOL/L (ref 136–145)
SPECIFIC GRAVITY, URINE: 1.01 (ref 1–1.03)
THYROTROPIN (MIU/L) IN SER/PLAS BY DETECTION LIMIT <= 0.05 MIU/L: 1.16 MIU/L (ref 0.44–3.98)
THYROXINE (T4) FREE (NG/DL) IN SER/PLAS: 0.96 NG/DL (ref 0.61–1.12)
TRIGLYCERIDE (MG/DL) IN SER/PLAS: 103 MG/DL (ref 0–149)
TRIIODOTHYRONINE (T3) FREE (PG/ML) IN SER/PLAS: 4 PG/ML (ref 2.3–4.2)
UREA NITROGEN (MG/DL) IN SER/PLAS: 17 MG/DL (ref 6–23)
UROBILINOGEN, URINE: <2 MG/DL (ref 0–1.9)
VLDL: 21 MG/DL (ref 0–40)

## 2023-08-18 PROCEDURE — 83036 HEMOGLOBIN GLYCOSYLATED A1C: CPT

## 2023-08-18 PROCEDURE — 84439 ASSAY OF FREE THYROXINE: CPT

## 2023-08-18 PROCEDURE — 80061 LIPID PANEL: CPT

## 2023-08-18 PROCEDURE — 80053 COMPREHEN METABOLIC PANEL: CPT

## 2023-08-18 PROCEDURE — 81003 URINALYSIS AUTO W/O SCOPE: CPT

## 2023-08-18 PROCEDURE — 84443 ASSAY THYROID STIM HORMONE: CPT

## 2023-08-18 PROCEDURE — 82306 VITAMIN D 25 HYDROXY: CPT

## 2023-08-18 PROCEDURE — 84481 FREE ASSAY (FT-3): CPT

## 2023-08-18 PROCEDURE — 83735 ASSAY OF MAGNESIUM: CPT

## 2023-08-18 PROCEDURE — 85025 COMPLETE CBC W/AUTO DIFF WBC: CPT

## 2023-08-18 NOTE — PROGRESS NOTES
Subjective   Patient ID: Polo Drew is a 63 y.o. male who presents for Labs Only (Pt in today for lab draw).    HPI     Review of Systems    Objective   There were no vitals taken for this visit.    Physical Exam    Assessment/Plan

## 2023-08-25 ENCOUNTER — OFFICE VISIT (OUTPATIENT)
Dept: PRIMARY CARE | Facility: CLINIC | Age: 63
End: 2023-08-25
Payer: COMMERCIAL

## 2023-08-25 VITALS
OXYGEN SATURATION: 98 % | DIASTOLIC BLOOD PRESSURE: 104 MMHG | HEART RATE: 93 BPM | SYSTOLIC BLOOD PRESSURE: 149 MMHG | BODY MASS INDEX: 23.8 KG/M2 | WEIGHT: 170 LBS | HEIGHT: 71 IN

## 2023-08-25 DIAGNOSIS — E55.9 VITAMIN D DEFICIENCY: Primary | ICD-10-CM

## 2023-08-25 DIAGNOSIS — M54.9 CHRONIC BACK PAIN, UNSPECIFIED BACK LOCATION, UNSPECIFIED BACK PAIN LATERALITY: ICD-10-CM

## 2023-08-25 DIAGNOSIS — G89.29 OTHER CHRONIC PAIN: ICD-10-CM

## 2023-08-25 DIAGNOSIS — E11.42 TYPE 2 DIABETES MELLITUS WITH DIABETIC POLYNEUROPATHY, WITH LONG-TERM CURRENT USE OF INSULIN (MULTI): ICD-10-CM

## 2023-08-25 DIAGNOSIS — E03.9 HYPOTHYROIDISM, UNSPECIFIED TYPE: ICD-10-CM

## 2023-08-25 DIAGNOSIS — Z79.4 TYPE 2 DIABETES MELLITUS WITH DIABETIC POLYNEUROPATHY, WITH LONG-TERM CURRENT USE OF INSULIN (MULTI): ICD-10-CM

## 2023-08-25 DIAGNOSIS — E78.5 HYPERLIPIDEMIA, UNSPECIFIED HYPERLIPIDEMIA TYPE: ICD-10-CM

## 2023-08-25 DIAGNOSIS — M54.9 CHRONIC BACK PAIN, UNSPECIFIED BACK LOCATION, UNSPECIFIED BACK PAIN LATERALITY: Primary | ICD-10-CM

## 2023-08-25 DIAGNOSIS — Z79.899 DRUG THERAPY: ICD-10-CM

## 2023-08-25 DIAGNOSIS — G89.29 CHRONIC BACK PAIN, UNSPECIFIED BACK LOCATION, UNSPECIFIED BACK PAIN LATERALITY: ICD-10-CM

## 2023-08-25 DIAGNOSIS — G89.29 CHRONIC BACK PAIN, UNSPECIFIED BACK LOCATION, UNSPECIFIED BACK PAIN LATERALITY: Primary | ICD-10-CM

## 2023-08-25 LAB
AMPHETAMINE (PRESENCE) IN URINE BY SCREEN METHOD: NORMAL
BARBITURATES PRESENCE IN URINE BY SCREEN METHOD: NORMAL
BENZODIAZEPINE (PRESENCE) IN URINE BY SCREEN METHOD: NORMAL
CANNABINOIDS IN URINE BY SCREEN METHOD: NORMAL
COCAINE (PRESENCE) IN URINE BY SCREEN METHOD: NORMAL
DRUG SCREEN COMMENT URINE: NORMAL
FENTANYL URINE: NORMAL
OPIATES (PRESENCE) IN URINE BY SCREEN METHOD: NORMAL
OXYCODONE (PRESENCE) IN URINE BY SCREEN METHOD: NORMAL
PHENCYCLIDINE (PRESENCE) IN URINE BY SCREEN METHOD: NORMAL

## 2023-08-25 PROCEDURE — 3044F HG A1C LEVEL LT 7.0%: CPT | Performed by: FAMILY MEDICINE

## 2023-08-25 PROCEDURE — 3077F SYST BP >= 140 MM HG: CPT | Performed by: FAMILY MEDICINE

## 2023-08-25 PROCEDURE — 99215 OFFICE O/P EST HI 40 MIN: CPT | Performed by: FAMILY MEDICINE

## 2023-08-25 PROCEDURE — 80307 DRUG TEST PRSMV CHEM ANLYZR: CPT

## 2023-08-25 PROCEDURE — 3080F DIAST BP >= 90 MM HG: CPT | Performed by: FAMILY MEDICINE

## 2023-08-25 RX ORDER — IBUPROFEN 800 MG/1
TABLET ORAL
Qty: 90 TABLET | Refills: 2 | Status: SHIPPED | OUTPATIENT
Start: 2023-08-25

## 2023-08-25 RX ORDER — TRAMADOL HYDROCHLORIDE 50 MG/1
50-100 TABLET ORAL 3 TIMES DAILY
Qty: 180 TABLET | Refills: 5 | Status: SHIPPED | OUTPATIENT
Start: 2023-08-25 | End: 2024-02-09

## 2023-08-25 RX ORDER — IBUPROFEN 800 MG/1
TABLET ORAL
COMMUNITY
Start: 2018-09-21 | End: 2023-08-25 | Stop reason: SDUPTHER

## 2023-08-25 NOTE — PROGRESS NOTES
Subjective   Patient ID: Polo Drew is a 63 y.o. male who presents for 4 Month FU / UDS / CSA (Pt in today for routine 4 month FU / controlled substance agreement signing / urine drug screen).      Review of Systems  Denies N/V/D/C, no HA/S/V, denies rashes/lesions, no CP/SOB. Denies fevers/chills.  All other systems were negative.    Objective   Physical Exam  Gen: NAD  eyes: EOMI, PERRLA  ENT: hearing grossly intact, no nasal discharge  resp: CTABL, without R/R  heart: RRR without MRG  GI: abd: S/ND/NT, BS+  lymph: no axillary, cervical, supraclavicular lymphadenopathy noted   MS: gait grossly WNL,  derm: no rashes or lesions noted  neuro: CN II-XII grossly intact  psych: A&Ox3    Assessment/Plan   Diagnoses and all orders for this visit:  Vitamin D deficiency  Other chronic pain  Drug therapy  Hypothyroidism, unspecified type  Hyperlipidemia, unspecified hyperlipidemia type  Type 2 diabetes mellitus with diabetic polyneuropathy, with long-term current use of insulin (CMS/[facility])  Chronic back pain, unspecified back location, unspecified back pain laterality           Patient Discussion/Summary     2024 will be next Annual wellness visit.  Aug 2024 will be next CSA/UDS.     -----  Need for immunizations: Has completed the shingrix series an had Tdap 2020. Is also up-to-date on annual annual flu shot. Patient has completed his coronavirus immunization series including bivalent booster. -->>  Keep up the good work staying up-to-date on your immunizations.     Screen for prostate cancer, psa 0.51. -->> We will recheck annually as previously discussed.  Colon cancer screening -->> patient has his Cologuard test at home, which he will send in before the end of August.   screen for hepatitis C negative August 2022.   ----  BMI 25, down 16 pounds since last appointment here, still down from max of 215. -->> Keep up the excellent work.     Chronic low back pain, on tramadol. Is being seen for controlled substance  refill appointment today.  OARRS report reviewed.  Will refill tramadol as needed.  New contract and UDS done today.-->> Will refill as needed.     Cardiac CT score is 585, patient stress test with cardiology as inconclusive, they were planning to do a nuclear stress test but it has not been done yet. Patient is discussing with cardiology, they are saying not to do his stress test until COVID improves. -->> Patient is going to contact cardiology/Dr Cooper (or whoever) to find out if you are due for any appointments or tests.  Can try looking up [employer].     Diabetic neuropathy, usually doing well enough on 1200 mg 3 times a day of Neurontin. Failed pregabalin/Lyrica (did not work as well as АНДРЕЙ). -->> we will refill gabapentin as needed.      New a1c reviewed:  - Type 2 diabetes, A1C 6.7, was 6.2, 5.7, 6.2, 10.6, 10.3, 7.2, 7.5, 7.5, 7.6, 8.6, 8.5, 6.0 (calc from fructosamine). Patient has improved diet and is on and miglitol and Trulicity 4.5 mg / week injections .  No longer on farxiga. Failed Metformin (diarrhea). Currently on 40 units daily of long-acting insulin. No longer having low sugar events. -->>  Will refill meds as needed.  Recheck A1c in 3 to 4 months.  - Hyperlipidemia, HDL 47, was 47, 51, 41, 42, 43, 43, 42, 47, goal is 45 or more so that's close. LDL of 61, was 37, 98, 54, 56, 67, 73, 68, 82, your goal is less than 70. On atorvastatin 40. -->> Continue current treatment, will recheck with next annual labs.  - Drug therapy. -->> next annual labs around aug 2024.  - Vitamin D deficiency, 84, was 71, 87, 94, 83, 64, 51, 36, 35. Goal is 75 - 100. Is now doing 5,000 units, 2 daily vitamin D3 6 days a week. -->> Continue current treatment, . We will recheck with next annual labs  - Hypothyroidism, TSH 1.55, was 0.87, 1.53, 0.74, FT3 and FT4 WNL. Euthyroid on 75 mcg daily. -->> Continue current 75 mcg daily. We'll recheck with next annual labs.   - Dehydrated, is again dehydrated on these labs.  Continue to drink plenty of water to help stay well-hydrated.  - Hypokalemia. 3.7, was 3.6, 3.7, 3.5, 3.3. Patient is eating more potassium-rich foods. -->> Continue potassium-rich foods.      Hypertension, on Dyazide and losartan, blood pressure elevated here today, Patient does checked fairly regularly and gets good numbers at rest. -->> Continue current treatment.        - Return in 6 months, on a Friday, for I/o A1c, annual preventative visit, CHRIS arthur.

## 2023-08-25 NOTE — PATIENT INSTRUCTIONS
Patient Discussion/Summary     2024 will be next Annual wellness visit.  Aug 2024 will be next CSA/UDS.     -----  Need for immunizations: Has completed the shingrix series an had Tdap 2020. Is also up-to-date on annual annual flu shot. Patient has completed his coronavirus immunization series including bivalent booster. -->>  Keep up the good work staying up-to-date on your immunizations.     Screen for prostate cancer, psa 0.51. -->> We will recheck annually as previously discussed.  Colon cancer screening -->> patient has his Cologuard test at home, which he will send in before the end of August.   screen for hepatitis C negative August 2022.   ----  BMI 25, down 16 pounds since last appointment here, still down from max of 215. -->> Keep up the excellent work.     Chronic low back pain, on tramadol. Is being seen for controlled substance refill appointment today.  OARRS report reviewed.  Will refill tramadol as needed.  New contract and UDS done today.-->> Will refill as needed.     Cardiac CT score is 585, patient stress test with cardiology as inconclusive, they were planning to do a nuclear stress test but it has not been done yet. Patient is discussing with cardiology, they are saying not to do his stress test until COVID improves. -->> Patient is going to contact cardiology/Dr Cooper (or whoever) to find out if you are due for any appointments or tests.  Can try looking up [email or web address].     Diabetic neuropathy, usually doing well enough on 1200 mg 3 times a day of Neurontin. Failed pregabalin/Lyrica (did not work as well as АНДРЕЙ). -->> we will refill gabapentin as needed.      New a1c reviewed:  - Type 2 diabetes, A1C 6.7, was 6.2, 5.7, 6.2, 10.6, 10.3, 7.2, 7.5, 7.5, 7.6, 8.6, 8.5, 6.0 (calc from fructosamine). Patient has improved diet and is on and miglitol and Trulicity 4.5 mg / week injections .  No longer on farxiga. Failed Metformin (diarrhea). Currently on 40 units daily of long-acting  insulin. No longer having low sugar events. -->>  Will refill meds as needed.  Recheck A1c in 3 to 4 months.  - Hyperlipidemia, HDL 47, was 47, 51, 41, 42, 43, 43, 42, 47, goal is 45 or more so that's close. LDL of 61, was 37, 98, 54, 56, 67, 73, 68, 82, your goal is less than 70. On atorvastatin 40. -->> Continue current treatment, will recheck with next annual labs.  - Drug therapy. -->> next annual labs around aug 2024.  - Vitamin D deficiency, 84, was 71, 87, 94, 83, 64, 51, 36, 35. Goal is 75 - 100. Is now doing 5,000 units, 2 daily vitamin D3 6 days a week. -->> Continue current treatment, . We will recheck with next annual labs  - Hypothyroidism, TSH 1.55, was 0.87, 1.53, 0.74, FT3 and FT4 WNL. Euthyroid on 75 mcg daily. -->> Continue current 75 mcg daily. We'll recheck with next annual labs.   - Dehydrated, is again dehydrated on these labs. Continue to drink plenty of water to help stay well-hydrated.  - Hypokalemia. 3.7, was 3.6, 3.7, 3.5, 3.3. Patient is eating more potassium-rich foods. -->> Continue potassium-rich foods.      Hypertension, on Dyazide and losartan, blood pressure elevated here today, Patient does checked fairly regularly and gets good numbers at rest. -->> Continue current treatment.        - Return in 6 months, on a Friday, for I/o A1c, annual preventative visit, CHRIS arthur.

## 2023-09-21 DIAGNOSIS — Z79.4 TYPE 2 DIABETES MELLITUS WITHOUT COMPLICATION, WITH LONG-TERM CURRENT USE OF INSULIN (MULTI): ICD-10-CM

## 2023-09-21 DIAGNOSIS — E11.9 TYPE 2 DIABETES MELLITUS WITHOUT COMPLICATION, WITH LONG-TERM CURRENT USE OF INSULIN (MULTI): ICD-10-CM

## 2023-09-22 RX ORDER — INSULIN GLARGINE 100 [IU]/ML
INJECTION, SOLUTION SUBCUTANEOUS
Qty: 45 ML | Refills: 3 | Status: SHIPPED | OUTPATIENT
Start: 2023-09-22

## 2023-09-28 PROBLEM — E11.40 DIABETIC NEUROPATHY (MULTI): Status: ACTIVE | Noted: 2023-09-28

## 2023-09-28 PROBLEM — E03.9 HYPOTHYROIDISM: Status: ACTIVE | Noted: 2023-09-28

## 2023-09-28 PROBLEM — G89.29 CHRONIC LOW BACK PAIN: Status: ACTIVE | Noted: 2023-09-28

## 2023-09-28 PROBLEM — M48.50XA COMPRESSION FRACTURE OF SPINE (MULTI): Status: ACTIVE | Noted: 2023-09-28

## 2023-09-28 PROBLEM — K08.89 ATYPICAL TOOTHACHE: Status: ACTIVE | Noted: 2023-09-28

## 2023-09-28 PROBLEM — Z87.81 HISTORY OF COMPRESSION FRACTURE OF SPINE: Status: ACTIVE | Noted: 2023-09-28

## 2023-09-28 PROBLEM — Z87.448 HISTORY OF HEMATURIA: Status: ACTIVE | Noted: 2023-09-28

## 2023-09-28 PROBLEM — Z86.19 HISTORY OF SHINGLES: Status: ACTIVE | Noted: 2023-09-28

## 2023-09-28 PROBLEM — M54.10 RADICULOPATHY: Status: ACTIVE | Noted: 2023-09-28

## 2023-09-28 PROBLEM — M17.9 KNEE OSTEOARTHRITIS: Status: ACTIVE | Noted: 2023-09-28

## 2023-09-28 PROBLEM — R94.39 ABNORMAL STRESS ELECTROCARDIOGRAM TEST: Status: ACTIVE | Noted: 2023-09-28

## 2023-09-28 PROBLEM — E78.5 HYPERLIPIDEMIA: Status: ACTIVE | Noted: 2023-09-28

## 2023-09-28 PROBLEM — R93.1 ABNORMAL SCREENING CARDIAC CT: Status: ACTIVE | Noted: 2023-09-28

## 2023-09-28 PROBLEM — R22.30 LOCALIZED SWELLING, MASS AND LUMP, UPPER LIMB: Status: ACTIVE | Noted: 2023-09-28

## 2023-09-28 PROBLEM — I10 HYPERTENSION: Status: ACTIVE | Noted: 2023-09-28

## 2023-09-28 PROBLEM — E11.9 TYPE 2 DIABETES MELLITUS (MULTI): Status: ACTIVE | Noted: 2023-09-28

## 2023-09-28 PROBLEM — M19.90 ARTHRITIS: Status: ACTIVE | Noted: 2023-09-28

## 2023-09-28 PROBLEM — N52.9 ERECTILE DYSFUNCTION: Status: ACTIVE | Noted: 2023-09-28

## 2023-09-28 PROBLEM — M54.50 CHRONIC LOW BACK PAIN: Status: ACTIVE | Noted: 2023-09-28

## 2023-09-28 PROBLEM — M54.30 SCIATICA: Status: ACTIVE | Noted: 2023-09-28

## 2023-09-28 PROBLEM — E55.9 VITAMIN D DEFICIENCY: Status: ACTIVE | Noted: 2023-09-28

## 2023-09-28 PROBLEM — I25.10 ARTERIOSCLEROTIC CORONARY ARTERY DISEASE: Status: ACTIVE | Noted: 2023-09-28

## 2023-09-28 RX ORDER — METFORMIN HYDROCHLORIDE 500 MG/1
2 TABLET ORAL 2 TIMES DAILY
COMMUNITY
Start: 2018-11-19

## 2023-09-28 RX ORDER — HYDROCODONE BITARTRATE AND ACETAMINOPHEN 5; 325 MG/1; MG/1
TABLET ORAL
COMMUNITY
Start: 2022-03-14

## 2023-09-28 RX ORDER — ASPIRIN 81 MG/1
1 TABLET ORAL DAILY
COMMUNITY
Start: 2019-10-10

## 2023-09-28 RX ORDER — GABAPENTIN 600 MG/1
TABLET ORAL
COMMUNITY
Start: 2018-04-18 | End: 2024-02-26

## 2023-09-28 RX ORDER — OXYCODONE AND ACETAMINOPHEN 5; 325 MG/1; MG/1
TABLET ORAL
COMMUNITY
Start: 2021-11-02

## 2023-09-28 RX ORDER — BLOOD SUGAR DIAGNOSTIC
STRIP MISCELLANEOUS
COMMUNITY
Start: 2017-02-13

## 2023-09-28 RX ORDER — GLIPIZIDE 10 MG/1
TABLET ORAL
COMMUNITY
Start: 2019-04-25

## 2023-09-28 RX ORDER — CYCLOBENZAPRINE HCL 5 MG
TABLET ORAL
COMMUNITY
Start: 2020-06-25

## 2023-09-28 RX ORDER — DICLOFENAC SODIUM 20 MG/G
SOLUTION TOPICAL
COMMUNITY
Start: 2022-08-12

## 2023-09-28 RX ORDER — LOSARTAN POTASSIUM 25 MG/1
1 TABLET ORAL DAILY
COMMUNITY
Start: 2017-11-22 | End: 2024-01-28

## 2023-09-28 RX ORDER — MULTIVIT-MIN/IRON/FOLIC ACID/K 18-600-40
CAPSULE ORAL
COMMUNITY

## 2023-09-28 RX ORDER — MELOXICAM 15 MG/1
15 TABLET ORAL
COMMUNITY
Start: 2012-07-16

## 2023-09-28 RX ORDER — FLUTICASONE PROPIONATE 50 MCG
SPRAY, SUSPENSION (ML) NASAL
COMMUNITY
Start: 2018-11-01

## 2023-09-28 RX ORDER — DULAGLUTIDE 3 MG/.5ML
INJECTION, SOLUTION SUBCUTANEOUS
COMMUNITY
End: 2023-11-01 | Stop reason: DRUGHIGH

## 2023-09-28 RX ORDER — SILDENAFIL 50 MG/1
TABLET, FILM COATED ORAL
COMMUNITY
Start: 2019-07-19

## 2023-09-28 RX ORDER — DOXEPIN HYDROCHLORIDE 50 MG/G
CREAM TOPICAL
COMMUNITY
Start: 2017-10-10

## 2023-09-28 RX ORDER — PNV NO.95/FERROUS FUM/FOLIC AC 28MG-0.8MG
TABLET ORAL
COMMUNITY

## 2023-09-28 RX ORDER — DAPAGLIFLOZIN 5 MG/1
TABLET, FILM COATED ORAL
COMMUNITY
Start: 2021-08-03

## 2023-09-28 RX ORDER — DULAGLUTIDE 4.5 MG/.5ML
INJECTION, SOLUTION SUBCUTANEOUS
COMMUNITY
Start: 2020-02-20 | End: 2023-11-01

## 2023-09-28 RX ORDER — PEN NEEDLE, DIABETIC 30 GX3/16"
NEEDLE, DISPOSABLE MISCELLANEOUS
COMMUNITY
Start: 2022-01-31

## 2023-09-28 RX ORDER — MIGLITOL 100 MG/1
TABLET, COATED ORAL
COMMUNITY
Start: 2018-09-24

## 2023-09-28 RX ORDER — LIDOCAINE 50 MG/G
OINTMENT TOPICAL
COMMUNITY
Start: 2016-10-04 | End: 2024-02-26

## 2023-09-28 RX ORDER — DICLOFENAC SODIUM 30 MG/G
GEL TOPICAL
COMMUNITY
Start: 2016-10-04

## 2023-09-28 RX ORDER — LORATADINE 10 MG/1
10 TABLET ORAL
COMMUNITY
Start: 2012-07-16

## 2023-09-28 RX ORDER — VALACYCLOVIR HYDROCHLORIDE 500 MG/1
TABLET, FILM COATED ORAL
COMMUNITY
Start: 2018-12-21

## 2023-10-05 ENCOUNTER — OFFICE VISIT (OUTPATIENT)
Dept: ORTHOPEDIC SURGERY | Facility: CLINIC | Age: 63
End: 2023-10-05
Payer: COMMERCIAL

## 2023-10-05 ENCOUNTER — ANCILLARY PROCEDURE (OUTPATIENT)
Dept: RADIOLOGY | Facility: CLINIC | Age: 63
End: 2023-10-05
Payer: COMMERCIAL

## 2023-10-05 DIAGNOSIS — S52.501D: ICD-10-CM

## 2023-10-05 DIAGNOSIS — S52.501D: Primary | ICD-10-CM

## 2023-10-05 PROCEDURE — 3080F DIAST BP >= 90 MM HG: CPT | Performed by: ORTHOPAEDIC SURGERY

## 2023-10-05 PROCEDURE — 4010F ACE/ARB THERAPY RXD/TAKEN: CPT | Performed by: ORTHOPAEDIC SURGERY

## 2023-10-05 PROCEDURE — 3044F HG A1C LEVEL LT 7.0%: CPT | Performed by: ORTHOPAEDIC SURGERY

## 2023-10-05 PROCEDURE — 3077F SYST BP >= 140 MM HG: CPT | Performed by: ORTHOPAEDIC SURGERY

## 2023-10-05 PROCEDURE — 73110 X-RAY EXAM OF WRIST: CPT | Mod: RT

## 2023-10-05 PROCEDURE — 99024 POSTOP FOLLOW-UP VISIT: CPT | Performed by: ORTHOPAEDIC SURGERY

## 2023-10-05 PROCEDURE — 73110 X-RAY EXAM OF WRIST: CPT | Mod: RIGHT SIDE | Performed by: ORTHOPAEDIC SURGERY

## 2023-10-05 NOTE — LETTER
October 5, 2023     Umair Luna DO  [address] [place] [facility], [address]  [facility] [place] [address]    Patient: Polo Derw   YOB: 1960   Date of Visit: 10/5/2023       Dear Dr. Umair Luna, DO:    Thank you for referring Polo Drew to me for evaluation. Below are my notes for this consultation.  If you have questions, please do not hesitate to call me. I look forward to following your patient along with you.       Sincerely,     Jose Guadalupe Ramon DO      CC: No Recipients  ______________________________________________________________________________________        10/5/2023    No chief complaint on file.        HPI:  Patient Polo Drew 63 y.o. male, presents today (10/5/2023) for evaluation of nonoperative management of right distal radius fracture, 5 weeks out.  Patient has been immobilized in a short arm cast.  States he is doing well overall with no pain in the cast.  Denies any new injury or trauma.      Past Medical History:   Diagnosis Date   • Other conditions influencing health status 04/19/2019    Broken back   • Personal history of (healed) traumatic fracture     History of compression fracture of spine   • Personal history of other diseases of the circulatory system 09/25/2020    History of hypertension   • Personal history of other diseases of urinary system 02/17/2020    History of hematuria   • Personal history of other endocrine, nutritional and metabolic disease     History of diabetes mellitus   • Personal history of other infectious and parasitic diseases 10/10/2019    History of herpes zoster   • Personal history of other specified conditions 09/25/2020    History of numbness   • Sneezing     Sneezing   • Unspecified epiphora, bilateral     Watery eyes       Medication Documentation Review Audit    **Prior to Admission medications have not yet been reviewed**         No Known Allergies    Social History     Socioeconomic History    • Marital status:      Spouse name: Not on file   • Number of children: Not on file   • Years of education: Not on file   • Highest education level: Not on file   Occupational History   • Not on file   Tobacco Use   • Smoking status: Not on file   • Smokeless tobacco: Not on file   Substance and Sexual Activity   • Alcohol use: Not on file   • Drug use: Not on file   • Sexual activity: Not on file   Other Topics Concern   • Not on file   Social History Narrative   • Not on file     Social Determinants of Health     Financial Resource Strain: Not on file   Food Insecurity: Not on file   Transportation Needs: Not on file   Physical Activity: Not on file   Stress: Not on file   Social Connections: Not on file   Intimate Partner Violence: Not on file   Housing Stability: Not on file       Past Surgical History:   Procedure Laterality Date   • BACK SURGERY  04/05/2017    Back Surgery   • KNEE SURGERY  04/05/2017    Knee Surgery         Review of Systems:   GENERAL: Negative  GI: Negative  MUSCULOSKELETAL: See HPI  SKIN: Negative  NEURO:  Negative    Physical Exam:  GENERAL: No acute distress and breathing comfortably; pleasant and cooperative with the examination.  EXTREMITIES: Evaluation of the right upper extremity finds the patient to have a palpable radial artery at the wrist with brisk capillary refill to all digits. The patient has intact sensorium to axillary, radial, median and ulnar nerves. There are no open wounds. There are no signs of infection. There is no evidence of lymphedema or lymphatic streaking. The patient has supple compartments of the right arm, forearm and hand.  He has full composite fist.  No extensor lag rotational deformity.  He has functional arc of motion flexion extension, approximately 40 degrees in each direction with about 75 degrees of supination full pronation noted.    Imaging:  3 views of the right wrist taken in the [place] office today show evidence of healing  intra-articular distal radius fracture with slight malunion.  Overall adequate alignment throughout all 3 planes.  Increased healing callus summation noted.       Assessment:  5 weeks out nonoperative management of right intra-articular distal radius fracture with slight malunion.  Overall healing well.     Plan:  Transition removal trauma splint.  Come out daily for hygiene gentle range of motion of the digits and wrist through home exercise program.  Continue with 1 pound maximum weightbearing restrictions for 2 weeks, then return to weightbearing activities as tolerated.  Follow-up with our office on an as-needed basis, if he has any trouble over the next few weeks he can call for repeat visit or therapy referral if needed, he declined it on today's exam.  Patient verbalized agreement understanding plan for care question answered today's visit.    Selma Jauregui PA-C

## 2023-10-05 NOTE — PROGRESS NOTES
10/5/2023    No chief complaint on file.        HPI:  Patient Polo Drew 63 y.o. male, presents today (10/5/2023) for evaluation of nonoperative management of right distal radius fracture, 5 weeks out.  Patient has been immobilized in a short arm cast.  States he is doing well overall with no pain in the cast.  Denies any new injury or trauma.      Past Medical History:   Diagnosis Date    Other conditions influencing health status 04/19/2019    Broken back    Personal history of (healed) traumatic fracture     History of compression fracture of spine    Personal history of other diseases of the circulatory system 09/25/2020    History of hypertension    Personal history of other diseases of urinary system 02/17/2020    History of hematuria    Personal history of other endocrine, nutritional and metabolic disease     History of diabetes mellitus    Personal history of other infectious and parasitic diseases 10/10/2019    History of herpes zoster    Personal history of other specified conditions 09/25/2020    History of numbness    Sneezing     Sneezing    Unspecified epiphora, bilateral     Watery eyes       Medication Documentation Review Audit    **Prior to Admission medications have not yet been reviewed**         No Known Allergies    Social History     Socioeconomic History    Marital status:      Spouse name: Not on file    Number of children: Not on file    Years of education: Not on file    Highest education level: Not on file   Occupational History    Not on file   Tobacco Use    Smoking status: Not on file    Smokeless tobacco: Not on file   Substance and Sexual Activity    Alcohol use: Not on file    Drug use: Not on file    Sexual activity: Not on file   Other Topics Concern    Not on file   Social History Narrative    Not on file     Social Determinants of Health     Financial Resource Strain: Not on file   Food Insecurity: Not on file   Transportation Needs: Not on file   Physical  Activity: Not on file   Stress: Not on file   Social Connections: Not on file   Intimate Partner Violence: Not on file   Housing Stability: Not on file       Past Surgical History:   Procedure Laterality Date    BACK SURGERY  04/05/2017    Back Surgery    KNEE SURGERY  04/05/2017    Knee Surgery         Review of Systems:   GENERAL: Negative  GI: Negative  MUSCULOSKELETAL: See HPI  SKIN: Negative  NEURO:  Negative    Physical Exam:  GENERAL: No acute distress and breathing comfortably; pleasant and cooperative with the examination.  EXTREMITIES: Evaluation of the right upper extremity finds the patient to have a palpable radial artery at the wrist with brisk capillary refill to all digits. The patient has intact sensorium to axillary, radial, median and ulnar nerves. There are no open wounds. There are no signs of infection. There is no evidence of lymphedema or lymphatic streaking. The patient has supple compartments of the right arm, forearm and hand.  He has full composite fist.  No extensor lag rotational deformity.  He has functional arc of motion flexion extension, approximately 40 degrees in each direction with about 75 degrees of supination full pronation noted.    Imaging:  3 views of the right wrist taken in the [place] office today show evidence of healing intra-articular distal radius fracture with slight malunion.  Overall adequate alignment throughout all 3 planes.  Increased healing callus summation noted.       Assessment:  5 weeks out nonoperative management of right intra-articular distal radius fracture with slight malunion.  Overall healing well.     Plan:  Transition removal trauma splint.  Come out daily for hygiene gentle range of motion of the digits and wrist through home exercise program.  Continue with 1 pound maximum weightbearing restrictions for 2 weeks, then return to weightbearing activities as tolerated.  Follow-up with our office on an as-needed basis, if he has any trouble over  the next few weeks he can call for repeat visit or therapy referral if needed, he declined it on today's exam.  Patient verbalized agreement understanding plan for care question answered today's visit.    Selma Jauregui PA-C

## 2023-11-01 DIAGNOSIS — Z79.4 TYPE 2 DIABETES MELLITUS WITH DIABETIC POLYNEUROPATHY, WITH LONG-TERM CURRENT USE OF INSULIN (MULTI): Primary | ICD-10-CM

## 2023-11-01 DIAGNOSIS — E11.42 TYPE 2 DIABETES MELLITUS WITH DIABETIC POLYNEUROPATHY, WITH LONG-TERM CURRENT USE OF INSULIN (MULTI): Primary | ICD-10-CM

## 2023-11-01 RX ORDER — DULAGLUTIDE 4.5 MG/.5ML
INJECTION, SOLUTION SUBCUTANEOUS
Qty: 6 ML | Refills: 3 | Status: SHIPPED | OUTPATIENT
Start: 2023-11-01

## 2023-12-13 DIAGNOSIS — Z79.4 TYPE 2 DIABETES MELLITUS WITH DIABETIC POLYNEUROPATHY, WITH LONG-TERM CURRENT USE OF INSULIN (MULTI): ICD-10-CM

## 2023-12-13 DIAGNOSIS — E11.42 TYPE 2 DIABETES MELLITUS WITH DIABETIC POLYNEUROPATHY, WITH LONG-TERM CURRENT USE OF INSULIN (MULTI): ICD-10-CM

## 2023-12-13 RX ORDER — PEN NEEDLE, DIABETIC 32GX 5/32"
NEEDLE, DISPOSABLE MISCELLANEOUS
Qty: 200 EACH | Refills: 3 | Status: SHIPPED | OUTPATIENT
Start: 2023-12-13

## 2024-01-26 DIAGNOSIS — I10 HYPERTENSION, UNSPECIFIED TYPE: Primary | ICD-10-CM

## 2024-01-28 RX ORDER — LOSARTAN POTASSIUM 25 MG/1
25 TABLET ORAL DAILY
Qty: 90 TABLET | Refills: 3 | Status: SHIPPED | OUTPATIENT
Start: 2024-01-28 | End: 2024-05-23 | Stop reason: SDUPTHER

## 2024-02-09 DIAGNOSIS — G89.29 OTHER CHRONIC PAIN: ICD-10-CM

## 2024-02-09 RX ORDER — TRAMADOL HYDROCHLORIDE 50 MG/1
TABLET ORAL
Qty: 180 TABLET | Refills: 0 | Status: SHIPPED | OUTPATIENT
Start: 2024-02-16 | End: 2024-02-23 | Stop reason: SDUPTHER

## 2024-02-23 ENCOUNTER — OFFICE VISIT (OUTPATIENT)
Dept: PRIMARY CARE | Facility: CLINIC | Age: 64
End: 2024-02-23
Payer: COMMERCIAL

## 2024-02-23 VITALS
OXYGEN SATURATION: 94 % | SYSTOLIC BLOOD PRESSURE: 131 MMHG | HEIGHT: 71 IN | WEIGHT: 179 LBS | BODY MASS INDEX: 25.06 KG/M2 | DIASTOLIC BLOOD PRESSURE: 64 MMHG | HEART RATE: 82 BPM

## 2024-02-23 DIAGNOSIS — G89.29 OTHER CHRONIC PAIN: ICD-10-CM

## 2024-02-23 DIAGNOSIS — Z13.9 SCREENING FOR CONDITION: ICD-10-CM

## 2024-02-23 DIAGNOSIS — Z79.899 DRUG THERAPY: ICD-10-CM

## 2024-02-23 DIAGNOSIS — Z12.5 SCREENING FOR PROSTATE CANCER: ICD-10-CM

## 2024-02-23 DIAGNOSIS — E55.9 VITAMIN D DEFICIENCY: ICD-10-CM

## 2024-02-23 DIAGNOSIS — Z00.00 PREVENTATIVE HEALTH CARE: Primary | ICD-10-CM

## 2024-02-23 DIAGNOSIS — G89.29 CHRONIC LOW BACK PAIN, UNSPECIFIED BACK PAIN LATERALITY, UNSPECIFIED WHETHER SCIATICA PRESENT: ICD-10-CM

## 2024-02-23 DIAGNOSIS — E11.9 DIABETES MELLITUS WITHOUT COMPLICATION (MULTI): ICD-10-CM

## 2024-02-23 DIAGNOSIS — E11.69 TYPE 2 DIABETES MELLITUS WITH OTHER SPECIFIED COMPLICATION, WITH LONG-TERM CURRENT USE OF INSULIN (MULTI): ICD-10-CM

## 2024-02-23 DIAGNOSIS — E03.9 HYPOTHYROIDISM, UNSPECIFIED TYPE: ICD-10-CM

## 2024-02-23 DIAGNOSIS — Z79.4 TYPE 2 DIABETES MELLITUS WITH OTHER SPECIFIED COMPLICATION, WITH LONG-TERM CURRENT USE OF INSULIN (MULTI): ICD-10-CM

## 2024-02-23 DIAGNOSIS — E78.5 HYPERLIPIDEMIA, UNSPECIFIED HYPERLIPIDEMIA TYPE: ICD-10-CM

## 2024-02-23 DIAGNOSIS — M54.50 CHRONIC LOW BACK PAIN, UNSPECIFIED BACK PAIN LATERALITY, UNSPECIFIED WHETHER SCIATICA PRESENT: ICD-10-CM

## 2024-02-23 DIAGNOSIS — I10 HYPERTENSION, UNSPECIFIED TYPE: ICD-10-CM

## 2024-02-23 LAB — POC HEMOGLOBIN A1C: 6.4 % (ref 4.2–6.5)

## 2024-02-23 PROCEDURE — 3075F SYST BP GE 130 - 139MM HG: CPT | Performed by: FAMILY MEDICINE

## 2024-02-23 PROCEDURE — 83036 HEMOGLOBIN GLYCOSYLATED A1C: CPT | Performed by: FAMILY MEDICINE

## 2024-02-23 PROCEDURE — 1036F TOBACCO NON-USER: CPT | Performed by: FAMILY MEDICINE

## 2024-02-23 PROCEDURE — 99214 OFFICE O/P EST MOD 30 MIN: CPT | Performed by: FAMILY MEDICINE

## 2024-02-23 PROCEDURE — 4010F ACE/ARB THERAPY RXD/TAKEN: CPT | Performed by: FAMILY MEDICINE

## 2024-02-23 PROCEDURE — 3078F DIAST BP <80 MM HG: CPT | Performed by: FAMILY MEDICINE

## 2024-02-23 PROCEDURE — 99396 PREV VISIT EST AGE 40-64: CPT | Performed by: FAMILY MEDICINE

## 2024-02-23 RX ORDER — TRAMADOL HYDROCHLORIDE 50 MG/1
50-100 TABLET ORAL 3 TIMES DAILY PRN
Qty: 180 TABLET | Refills: 5 | Status: SHIPPED | OUTPATIENT
Start: 2024-03-17

## 2024-02-23 RX ORDER — TRAMADOL HYDROCHLORIDE 50 MG/1
50-100 TABLET ORAL 3 TIMES DAILY PRN
Qty: 180 TABLET | Refills: 5 | Status: SHIPPED | OUTPATIENT
Start: 2024-03-17 | End: 2024-02-23 | Stop reason: SDUPTHER

## 2024-02-23 NOTE — PATIENT INSTRUCTIONS
Patient Discussion/Summary     Doing Annual wellness visit.  Aug 2024 will be next CSA/UDS.     -----  Need for immunizations: Has completed the shingrix series an had Tdap 2020. Is also up-to-date on annual annual flu shot as well as latest coronavirus booster.  He is due for the new RSV shot. -->>  Keep up the good work staying up-to-date on your immunizations.     Screen for prostate cancer, psa 0.51. -->> We will recheck annually as previously discussed.    Colon cancer screening -->> patient has his Cologuard test at home, which he will send in before the end of August.     screen for hepatitis C negative August 2022.   ----      BMI 24, down 7 pounds in the last year or so, still down from max of 215. -->> Keep up the excellent work.      New a1c reviewed:    - Type 2 diabetes, A1C 6.4, was 6.7, 6.2, 5.7, 6.2, 10.6, 10.3, 7.2, 7.5, 7.5, 7.6, 8.6, 8.5, 6.0 (calc from fructosamine). Patient has improved diet, still on Trulicity 4.5 mg / week injections as 40 units daily long-acting insulin.  No longer on and is longer on miglitol, farxiga.  Still no low sugar events. -->>  Will refill meds as needed.  Recheck A1c with next annual labs around August.    Regarding previous labs:    - Hyperlipidemia, HDL 47, was 47, 51, 41, 42, 43, 43, 42, 47, goal is 45 or more so that's close. LDL of 61, was 37, 98, 54, 56, 67, 73, 68, 82, your goal is less than 70. On atorvastatin 40. -->> Continue current treatment, will recheck with next annual labs.  - Drug therapy. -->> next annual labs around aug 2024.  - Vitamin D deficiency, 84, was 71, 87, 94, 83, 64, 51, 36, 35. Goal is 75 - 100. Is now doing 5,000 units, 2 daily vitamin D3 6 days a week. -->> Continue current treatment, . We will recheck with next annual labs  - Hypothyroidism, TSH 1.55, was 0.87, 1.53, 0.74, FT3 and FT4 WNL. Euthyroid on 75 mcg daily. -->> Continue current 75 mcg daily. We'll recheck with next annual labs.   - Dehydrated, is again dehydrated on these  labs. Continue to drink plenty of water to help stay well-hydrated.  - Hypokalemia. 3.7, was 3.6, 3.7, 3.5, 3.3. Patient is eating more potassium-rich foods. -->> Continue potassium-rich foods.         Chronic low back pain, on tramadol. Is being seen for controlled substance refill appointment today.  OARRS report reviewed.  Will refill tramadol as needed.  Next contract/UDS due around August.  -->> Will refill as needed.     Cardiac CT score is 585, patient stress test with cardiology as inconclusive, they were planning to do a nuclear stress test but it has not been done yet. Patient is discussing with cardiology, they are saying not to do his stress test until COVID improves. -->> Patient is going to contact cardiology/Dr Cooper (or whoever) to find out if you are due for any appointments or tests.  Can try looking up [employer].     Diabetic neuropathy, usually doing well enough on 1200 mg 3 times a day of Neurontin. Failed pregabalin/Lyrica (did not work as well as АНДРЕЙ). -->> we will refill gabapentin as needed.      Hypertension, on Dyazide and losartan, blood pressure elevated here today, Patient does checked fairly regularly and gets good numbers at rest. -->> Continue current treatment.       -Return on Friday, August 2 to get labs drawn that were ordered today.  - Return in 6 months, on a Friday August 16., for annual lab review, controlled substance contract/UDS.

## 2024-02-23 NOTE — PROGRESS NOTES
Subjective   Patient ID: Polo Drew is a 63 y.o. male who presents for 6 Month FU (Pt in today for routine 6 month FU / Medication refill / IO A1c).      Review of Systems  Denies N/V/D/C, no HA/S/V, denies rashes/lesions, no CP/SOB. Denies fevers/chills.  All other systems were negative.    Objective   Physical Exam  Gen: NAD  eyes: EOMI, PERRLA  ENT: hearing grossly intact, no nasal discharge  resp: CTABL, without R/R  heart: RRR without MRG  GI: abd: S/ND/NT, BS+  lymph: no axillary, cervical, supraclavicular lymphadenopathy noted   MS: gait grossly WNL,  derm: no rashes or lesions noted  neuro: CN II-XII grossly intact  psych: A&Ox3    Assessment/Plan   Diagnoses and all orders for this visit:  Preventative health care  -     CBC and Auto Differential; Future  -     Comprehensive Metabolic Panel; Future  -     TSH with reflex to Free T4 if abnormal; Future  -     Magnesium; Future  -     Lipid Panel; Future  -     Hemoglobin A1C; Future  -     Urinalysis with Reflex Microscopic; Future  -     Vitamin D 25-Hydroxy,Total (for eval of Vitamin D levels); Future  -     Thyroxine, Free; Future  -     Triiodothyronine, Free; Future  -     Thyroid Stimulating Hormone; Future  -     Prostate Spec.Ag,Screen; Future  -     traMADol (Ultram) 50 mg tablet; Take 1-2 tablets ( mg) by mouth 3 times a day as needed for severe pain (7 - 10) or moderate pain (4 - 6). Do not start before March 17, 2024.  Diabetes mellitus without complication (CMS/[facility])  -     POCT glycosylated hemoglobin (Hb A1C) manually resulted  Drug therapy  -     CBC and Auto Differential; Future  -     Comprehensive Metabolic Panel; Future  -     Magnesium; Future  -     Urinalysis with Reflex Microscopic; Future  Screening for condition  -     TSH with reflex to Free T4 if abnormal; Future  -     Lipid Panel; Future  -     Hemoglobin A1C; Future  Hypothyroidism, unspecified type  -     Thyroxine, Free; Future  -     Triiodothyronine, Free;  Future  -     Thyroid Stimulating Hormone; Future  Other chronic pain  -     traMADol (Ultram) 50 mg tablet; Take 1-2 tablets ( mg) by mouth 3 times a day as needed for severe pain (7 - 10) or moderate pain (4 - 6). Do not start before March 17, 2024.  Vitamin D deficiency  -     Vitamin D 25-Hydroxy,Total (for eval of Vitamin D levels); Future  Type 2 diabetes mellitus with other specified complication, with long-term current use of insulin (CMS/[facility])  -     Hemoglobin A1C; Future  Hypertension, unspecified type  Hyperlipidemia, unspecified hyperlipidemia type  -     Lipid Panel; Future  Chronic low back pain, unspecified back pain laterality, unspecified whether sciatica present  Screening for prostate cancer  -     Prostate Spec.Ag,Screen; Future      Patient Discussion/Summary     Doing Annual wellness visit.  Aug 2024 will be next CSA/UDS.     -----  Need for immunizations: Has completed the shingrix series an had Tdap 2020. Is also up-to-date on annual annual flu shot as well as latest coronavirus booster.  He is due for the new RSV shot. -->>  Keep up the good work staying up-to-date on your immunizations.     Screen for prostate cancer, psa 0.51. -->> We will recheck annually as previously discussed.    Colon cancer screening -->> patient has his Cologuard test at home, which he will send in before the end of August.     screen for hepatitis C negative August 2022.   ----      BMI 24, down 7 pounds in the last year or so, still down from max of 215. -->> Keep up the excellent work.      New a1c reviewed:    - Type 2 diabetes, A1C 6.4, was 6.7, 6.2, 5.7, 6.2, 10.6, 10.3, 7.2, 7.5, 7.5, 7.6, 8.6, 8.5, 6.0 (calc from fructosamine). Patient has improved diet, still on Trulicity 4.5 mg / week injections as 40 units daily long-acting insulin.  No longer on and is longer on miglitol, farxiga.  Still no low sugar events. -->>  Will refill meds as needed.  Recheck A1c with next annual labs around  August.    Regarding previous labs:    - Hyperlipidemia, HDL 47, was 47, 51, 41, 42, 43, 43, 42, 47, goal is 45 or more so that's close. LDL of 61, was 37, 98, 54, 56, 67, 73, 68, 82, your goal is less than 70. On atorvastatin 40. -->> Continue current treatment, will recheck with next annual labs.  - Drug therapy. -->> next annual labs around aug 2024.  - Vitamin D deficiency, 84, was 71, 87, 94, 83, 64, 51, 36, 35. Goal is 75 - 100. Is now doing 5,000 units, 2 daily vitamin D3 6 days a week. -->> Continue current treatment, . We will recheck with next annual labs  - Hypothyroidism, TSH 1.55, was 0.87, 1.53, 0.74, FT3 and FT4 WNL. Euthyroid on 75 mcg daily. -->> Continue current 75 mcg daily. We'll recheck with next annual labs.   - Dehydrated, is again dehydrated on these labs. Continue to drink plenty of water to help stay well-hydrated.  - Hypokalemia. 3.7, was 3.6, 3.7, 3.5, 3.3. Patient is eating more potassium-rich foods. -->> Continue potassium-rich foods.         Chronic low back pain, on tramadol. Is being seen for controlled substance refill appointment today.  OARRS report reviewed.  Will refill tramadol as needed.  Next contract/UDS due around August.  -->> Will refill as needed.     Cardiac CT score is 585, patient stress test with cardiology as inconclusive, they were planning to do a nuclear stress test but it has not been done yet. Patient is discussing with cardiology, they are saying not to do his stress test until COVID improves. -->> Patient is going to contact cardiology/Dr Cooper (or whoever) to find out if you are due for any appointments or tests.  Can try looking up [email or web address].     Diabetic neuropathy, usually doing well enough on 1200 mg 3 times a day of Neurontin. Failed pregabalin/Lyrica (did not work as well as АНДРЕЙ). -->> we will refill gabapentin as needed.      Hypertension, on Dyazide and losartan, blood pressure elevated here today, Patient does checked fairly regularly and  gets good numbers at rest. -->> Continue current treatment.       -Return on Friday, August 2 to get labs drawn that were ordered today.  - Return in 6 months, on a Friday August 16., for annual lab review, controlled substance contract/UDS.

## 2024-02-25 DIAGNOSIS — M54.10 RADICULOPATHY, UNSPECIFIED SPINAL REGION: Primary | ICD-10-CM

## 2024-02-26 RX ORDER — LIDOCAINE 50 MG/G
OINTMENT TOPICAL
Qty: 100 G | Refills: 13 | Status: SHIPPED | OUTPATIENT
Start: 2024-02-26

## 2024-02-26 RX ORDER — GABAPENTIN 600 MG/1
1200 TABLET ORAL 3 TIMES DAILY
Qty: 540 TABLET | Refills: 3 | Status: SHIPPED | OUTPATIENT
Start: 2024-02-26 | End: 2024-05-23 | Stop reason: SDUPTHER

## 2024-04-01 DIAGNOSIS — E03.9 HYPOTHYROIDISM, UNSPECIFIED TYPE: ICD-10-CM

## 2024-04-01 DIAGNOSIS — I10 HYPERTENSION, UNSPECIFIED TYPE: ICD-10-CM

## 2024-04-01 RX ORDER — TRIAMTERENE AND HYDROCHLOROTHIAZIDE 37.5; 25 MG/1; MG/1
CAPSULE ORAL
Qty: 90 CAPSULE | Refills: 3 | Status: SHIPPED | OUTPATIENT
Start: 2024-04-01 | End: 2024-05-23 | Stop reason: SDUPTHER

## 2024-04-01 RX ORDER — LEVOTHYROXINE SODIUM 75 UG/1
TABLET ORAL
Qty: 90 TABLET | Refills: 3 | Status: SHIPPED | OUTPATIENT
Start: 2024-04-01 | End: 2024-05-23 | Stop reason: SDUPTHER

## 2024-04-17 DIAGNOSIS — E78.5 HYPERLIPIDEMIA, UNSPECIFIED HYPERLIPIDEMIA TYPE: ICD-10-CM

## 2024-04-18 RX ORDER — ATORVASTATIN CALCIUM 40 MG/1
TABLET, FILM COATED ORAL
Qty: 90 TABLET | Refills: 3 | Status: SHIPPED | OUTPATIENT
Start: 2024-04-18 | End: 2024-05-23 | Stop reason: SDUPTHER

## 2024-05-23 DIAGNOSIS — M54.10 RADICULOPATHY, UNSPECIFIED SPINAL REGION: ICD-10-CM

## 2024-05-23 DIAGNOSIS — E03.9 HYPOTHYROIDISM, UNSPECIFIED TYPE: ICD-10-CM

## 2024-05-23 DIAGNOSIS — E78.5 HYPERLIPIDEMIA, UNSPECIFIED HYPERLIPIDEMIA TYPE: ICD-10-CM

## 2024-05-23 DIAGNOSIS — I10 HYPERTENSION, UNSPECIFIED TYPE: ICD-10-CM

## 2024-05-24 RX ORDER — LEVOTHYROXINE SODIUM 75 UG/1
75 TABLET ORAL DAILY
Qty: 90 TABLET | Refills: 3 | Status: SHIPPED | OUTPATIENT
Start: 2024-05-24

## 2024-05-24 RX ORDER — LOSARTAN POTASSIUM 25 MG/1
25 TABLET ORAL DAILY
Qty: 90 TABLET | Refills: 3 | Status: SHIPPED | OUTPATIENT
Start: 2024-05-24

## 2024-05-24 RX ORDER — ATORVASTATIN CALCIUM 40 MG/1
TABLET, FILM COATED ORAL
Qty: 90 TABLET | Refills: 3 | Status: SHIPPED | OUTPATIENT
Start: 2024-05-24

## 2024-05-24 RX ORDER — TRIAMTERENE AND HYDROCHLOROTHIAZIDE 37.5; 25 MG/1; MG/1
CAPSULE ORAL
Qty: 90 CAPSULE | Refills: 3 | Status: SHIPPED | OUTPATIENT
Start: 2024-05-24

## 2024-05-24 RX ORDER — GABAPENTIN 600 MG/1
1200 TABLET ORAL 3 TIMES DAILY
Qty: 540 TABLET | Refills: 3 | Status: SHIPPED | OUTPATIENT
Start: 2024-05-24

## 2024-07-02 DIAGNOSIS — M54.10 RADICULOPATHY, UNSPECIFIED SPINAL REGION: ICD-10-CM

## 2024-07-02 DIAGNOSIS — E03.9 HYPOTHYROIDISM, UNSPECIFIED TYPE: ICD-10-CM

## 2024-07-03 DIAGNOSIS — M54.10 RADICULOPATHY, UNSPECIFIED SPINAL REGION: ICD-10-CM

## 2024-07-03 DIAGNOSIS — E03.9 HYPOTHYROIDISM, UNSPECIFIED TYPE: ICD-10-CM

## 2024-07-03 RX ORDER — LEVOTHYROXINE SODIUM 75 UG/1
75 TABLET ORAL DAILY
Qty: 90 TABLET | Refills: 3 | Status: SHIPPED | OUTPATIENT
Start: 2024-07-03

## 2024-07-03 RX ORDER — GABAPENTIN 600 MG/1
TABLET ORAL
Refills: 0 | OUTPATIENT
Start: 2024-07-03

## 2024-07-03 RX ORDER — LEVOTHYROXINE SODIUM 75 UG/1
TABLET ORAL
Refills: 0 | OUTPATIENT
Start: 2024-07-03

## 2024-07-03 RX ORDER — GABAPENTIN 600 MG/1
1200 TABLET ORAL 3 TIMES DAILY
Qty: 540 TABLET | Refills: 3 | Status: SHIPPED | OUTPATIENT
Start: 2024-07-03

## 2024-08-16 ENCOUNTER — APPOINTMENT (OUTPATIENT)
Dept: PRIMARY CARE | Facility: CLINIC | Age: 64
End: 2024-08-16
Payer: COMMERCIAL

## 2024-08-16 DIAGNOSIS — E03.9 HYPOTHYROIDISM, UNSPECIFIED TYPE: ICD-10-CM

## 2024-08-16 DIAGNOSIS — Z00.00 PREVENTATIVE HEALTH CARE: ICD-10-CM

## 2024-08-16 DIAGNOSIS — E11.69 TYPE 2 DIABETES MELLITUS WITH OTHER SPECIFIED COMPLICATION, WITH LONG-TERM CURRENT USE OF INSULIN (MULTI): ICD-10-CM

## 2024-08-16 DIAGNOSIS — E78.5 HYPERLIPIDEMIA, UNSPECIFIED HYPERLIPIDEMIA TYPE: ICD-10-CM

## 2024-08-16 DIAGNOSIS — Z79.899 DRUG THERAPY: ICD-10-CM

## 2024-08-16 DIAGNOSIS — Z13.9 SCREENING FOR CONDITION: ICD-10-CM

## 2024-08-16 DIAGNOSIS — Z79.4 TYPE 2 DIABETES MELLITUS WITH OTHER SPECIFIED COMPLICATION, WITH LONG-TERM CURRENT USE OF INSULIN (MULTI): ICD-10-CM

## 2024-08-16 DIAGNOSIS — Z12.5 SCREENING FOR PROSTATE CANCER: ICD-10-CM

## 2024-08-16 DIAGNOSIS — E55.9 VITAMIN D DEFICIENCY: ICD-10-CM

## 2024-08-16 LAB
25(OH)D3 SERPL-MCNC: 77 NG/ML (ref 30–100)
ALBUMIN SERPL BCP-MCNC: 4.4 G/DL (ref 3.4–5)
ALP SERPL-CCNC: 69 U/L (ref 33–136)
ALT SERPL W P-5'-P-CCNC: 24 U/L (ref 10–52)
ANION GAP SERPL CALC-SCNC: 15 MMOL/L (ref 10–20)
APPEARANCE UR: CLEAR
AST SERPL W P-5'-P-CCNC: 23 U/L (ref 9–39)
BASOPHILS # BLD AUTO: 0.04 X10*3/UL (ref 0–0.1)
BASOPHILS NFR BLD AUTO: 0.4 %
BILIRUB SERPL-MCNC: 0.8 MG/DL (ref 0–1.2)
BILIRUB UR STRIP.AUTO-MCNC: NEGATIVE MG/DL
BUN SERPL-MCNC: 19 MG/DL (ref 6–23)
CALCIUM SERPL-MCNC: 9.3 MG/DL (ref 8.6–10.3)
CHLORIDE SERPL-SCNC: 97 MMOL/L (ref 98–107)
CHOLEST SERPL-MCNC: 118 MG/DL (ref 0–199)
CHOLESTEROL/HDL RATIO: 2.2
CO2 SERPL-SCNC: 29 MMOL/L (ref 21–32)
COLOR UR: YELLOW
CREAT SERPL-MCNC: 0.72 MG/DL (ref 0.5–1.3)
EGFRCR SERPLBLD CKD-EPI 2021: >90 ML/MIN/1.73M*2
EOSINOPHIL # BLD AUTO: 0.09 X10*3/UL (ref 0–0.7)
EOSINOPHIL NFR BLD AUTO: 0.9 %
ERYTHROCYTE [DISTWIDTH] IN BLOOD BY AUTOMATED COUNT: 12.3 % (ref 11.5–14.5)
GLUCOSE SERPL-MCNC: 133 MG/DL (ref 74–99)
GLUCOSE UR STRIP.AUTO-MCNC: NORMAL MG/DL
HCT VFR BLD AUTO: 42 % (ref 41–52)
HDLC SERPL-MCNC: 54 MG/DL
HGB BLD-MCNC: 15 G/DL (ref 13.5–17.5)
IMM GRANULOCYTES # BLD AUTO: 0.03 X10*3/UL (ref 0–0.7)
IMM GRANULOCYTES NFR BLD AUTO: 0.3 % (ref 0–0.9)
KETONES UR STRIP.AUTO-MCNC: NEGATIVE MG/DL
LDLC SERPL CALC-MCNC: 50 MG/DL
LEUKOCYTE ESTERASE UR QL STRIP.AUTO: NEGATIVE
LYMPHOCYTES # BLD AUTO: 2.36 X10*3/UL (ref 1.2–4.8)
LYMPHOCYTES NFR BLD AUTO: 23 %
MAGNESIUM SERPL-MCNC: 2.09 MG/DL (ref 1.6–2.4)
MCH RBC QN AUTO: 30 PG (ref 26–34)
MCHC RBC AUTO-ENTMCNC: 35.7 G/DL (ref 32–36)
MCV RBC AUTO: 84 FL (ref 80–100)
MONOCYTES # BLD AUTO: 0.89 X10*3/UL (ref 0.1–1)
MONOCYTES NFR BLD AUTO: 8.7 %
NEUTROPHILS # BLD AUTO: 6.85 X10*3/UL (ref 1.2–7.7)
NEUTROPHILS NFR BLD AUTO: 66.7 %
NITRITE UR QL STRIP.AUTO: NEGATIVE
NON HDL CHOLESTEROL: 64 MG/DL (ref 0–149)
NRBC BLD-RTO: 0 /100 WBCS (ref 0–0)
PH UR STRIP.AUTO: 6 [PH]
PLATELET # BLD AUTO: 297 X10*3/UL (ref 150–450)
POTASSIUM SERPL-SCNC: 3.9 MMOL/L (ref 3.5–5.3)
PROT SERPL-MCNC: 7.1 G/DL (ref 6.4–8.2)
PROT UR STRIP.AUTO-MCNC: NEGATIVE MG/DL
PSA SERPL-MCNC: 0.44 NG/ML
RBC # BLD AUTO: 5 X10*6/UL (ref 4.5–5.9)
RBC # UR STRIP.AUTO: NEGATIVE /UL
SODIUM SERPL-SCNC: 137 MMOL/L (ref 136–145)
SP GR UR STRIP.AUTO: 1.03
T4 FREE SERPL-MCNC: 0.95 NG/DL (ref 0.61–1.12)
TRIGL SERPL-MCNC: 71 MG/DL (ref 0–149)
TSH SERPL-ACNC: 0.83 MIU/L (ref 0.44–3.98)
UROBILINOGEN UR STRIP.AUTO-MCNC: NORMAL MG/DL
VLDL: 14 MG/DL (ref 0–40)
WBC # BLD AUTO: 10.3 X10*3/UL (ref 4.4–11.3)

## 2024-08-16 PROCEDURE — 80053 COMPREHEN METABOLIC PANEL: CPT

## 2024-08-16 PROCEDURE — 84443 ASSAY THYROID STIM HORMONE: CPT

## 2024-08-16 PROCEDURE — 83735 ASSAY OF MAGNESIUM: CPT

## 2024-08-16 PROCEDURE — 84481 FREE ASSAY (FT-3): CPT

## 2024-08-16 PROCEDURE — 84153 ASSAY OF PSA TOTAL: CPT

## 2024-08-16 PROCEDURE — 85025 COMPLETE CBC W/AUTO DIFF WBC: CPT

## 2024-08-16 PROCEDURE — 80061 LIPID PANEL: CPT

## 2024-08-16 PROCEDURE — 81003 URINALYSIS AUTO W/O SCOPE: CPT

## 2024-08-16 PROCEDURE — 84439 ASSAY OF FREE THYROXINE: CPT

## 2024-08-16 PROCEDURE — 83036 HEMOGLOBIN GLYCOSYLATED A1C: CPT

## 2024-08-16 PROCEDURE — 82306 VITAMIN D 25 HYDROXY: CPT

## 2024-08-16 NOTE — PROGRESS NOTES
Subjective   Patient ID: Polo Drew is a 64 y.o. male who presents for Labs Only (Pt in today for lab draw).    HPI     Review of Systems    Objective   There were no vitals taken for this visit.    Physical Exam    Assessment/Plan

## 2024-08-17 LAB
EST. AVERAGE GLUCOSE BLD GHB EST-MCNC: 160 MG/DL
HBA1C MFR BLD: 7.2 %
T3FREE SERPL-MCNC: 3.6 PG/ML (ref 2.3–4.2)

## 2024-08-23 ENCOUNTER — APPOINTMENT (OUTPATIENT)
Dept: PRIMARY CARE | Facility: CLINIC | Age: 64
End: 2024-08-23
Payer: COMMERCIAL

## 2024-09-03 DIAGNOSIS — Z00.00 PREVENTATIVE HEALTH CARE: ICD-10-CM

## 2024-09-03 DIAGNOSIS — G89.29 OTHER CHRONIC PAIN: ICD-10-CM

## 2024-09-03 RX ORDER — TRAMADOL HYDROCHLORIDE 50 MG/1
50-100 TABLET ORAL 3 TIMES DAILY PRN
Qty: 180 TABLET | Refills: 5 | Status: SHIPPED | OUTPATIENT
Start: 2024-09-03

## 2024-09-03 RX ORDER — TRAMADOL HYDROCHLORIDE 50 MG/1
TABLET ORAL
Qty: 180 TABLET | Refills: 0 | OUTPATIENT
Start: 2024-09-03

## 2024-09-04 ENCOUNTER — HOSPITAL ENCOUNTER (OUTPATIENT)
Dept: RADIOLOGY | Facility: HOSPITAL | Age: 64
Discharge: HOME | End: 2024-09-04
Payer: COMMERCIAL

## 2024-09-04 DIAGNOSIS — S63.92XA SPRAIN OF UNSPECIFIED PART OF LEFT WRIST AND HAND, INITIAL ENCOUNTER: ICD-10-CM

## 2024-09-04 DIAGNOSIS — S63.501A UNSPECIFIED SPRAIN OF RIGHT WRIST, INITIAL ENCOUNTER: ICD-10-CM

## 2024-09-04 PROCEDURE — 73110 X-RAY EXAM OF WRIST: CPT | Mod: 50

## 2024-09-04 PROCEDURE — 73130 X-RAY EXAM OF HAND: CPT | Mod: 50

## 2024-09-20 ENCOUNTER — APPOINTMENT (OUTPATIENT)
Dept: PRIMARY CARE | Facility: CLINIC | Age: 64
End: 2024-09-20
Payer: COMMERCIAL

## 2024-09-20 VITALS
OXYGEN SATURATION: 97 % | WEIGHT: 174 LBS | HEIGHT: 71 IN | DIASTOLIC BLOOD PRESSURE: 86 MMHG | SYSTOLIC BLOOD PRESSURE: 145 MMHG | HEART RATE: 63 BPM | BODY MASS INDEX: 24.36 KG/M2

## 2024-09-20 DIAGNOSIS — E11.69 TYPE 2 DIABETES MELLITUS WITH OTHER SPECIFIED COMPLICATION, WITH LONG-TERM CURRENT USE OF INSULIN: ICD-10-CM

## 2024-09-20 DIAGNOSIS — E78.5 HYPERLIPIDEMIA, UNSPECIFIED HYPERLIPIDEMIA TYPE: ICD-10-CM

## 2024-09-20 DIAGNOSIS — Z00.00 PREVENTATIVE HEALTH CARE: ICD-10-CM

## 2024-09-20 DIAGNOSIS — Z79.899 DRUG THERAPY: ICD-10-CM

## 2024-09-20 DIAGNOSIS — G89.29 CHRONIC BACK PAIN, UNSPECIFIED BACK LOCATION, UNSPECIFIED BACK PAIN LATERALITY: ICD-10-CM

## 2024-09-20 DIAGNOSIS — Z79.4 TYPE 2 DIABETES MELLITUS WITH OTHER SPECIFIED COMPLICATION, WITH LONG-TERM CURRENT USE OF INSULIN: ICD-10-CM

## 2024-09-20 DIAGNOSIS — G89.29 CHRONIC LOW BACK PAIN, UNSPECIFIED BACK PAIN LATERALITY, UNSPECIFIED WHETHER SCIATICA PRESENT: ICD-10-CM

## 2024-09-20 DIAGNOSIS — L08.9 SKIN INFECTION: Primary | ICD-10-CM

## 2024-09-20 DIAGNOSIS — E55.9 VITAMIN D DEFICIENCY: ICD-10-CM

## 2024-09-20 DIAGNOSIS — I10 HYPERTENSION, UNSPECIFIED TYPE: ICD-10-CM

## 2024-09-20 DIAGNOSIS — M54.50 CHRONIC LOW BACK PAIN, UNSPECIFIED BACK PAIN LATERALITY, UNSPECIFIED WHETHER SCIATICA PRESENT: ICD-10-CM

## 2024-09-20 DIAGNOSIS — Z13.9 SCREENING FOR CONDITION: ICD-10-CM

## 2024-09-20 DIAGNOSIS — E03.9 HYPOTHYROIDISM, UNSPECIFIED TYPE: ICD-10-CM

## 2024-09-20 DIAGNOSIS — M54.9 CHRONIC BACK PAIN, UNSPECIFIED BACK LOCATION, UNSPECIFIED BACK PAIN LATERALITY: ICD-10-CM

## 2024-09-20 DIAGNOSIS — G89.29 OTHER CHRONIC PAIN: ICD-10-CM

## 2024-09-20 LAB
AMPHETAMINES UR QL SCN: NORMAL
BARBITURATES UR QL SCN: NORMAL
BENZODIAZ UR QL SCN: NORMAL
BZE UR QL SCN: NORMAL
CANNABINOIDS UR QL SCN: NORMAL
FENTANYL+NORFENTANYL UR QL SCN: NORMAL
METHADONE UR QL SCN: NORMAL
OPIATES UR QL SCN: NORMAL
OXYCODONE+OXYMORPHONE UR QL SCN: NORMAL
PCP UR QL SCN: NORMAL

## 2024-09-20 PROCEDURE — 4010F ACE/ARB THERAPY RXD/TAKEN: CPT | Performed by: FAMILY MEDICINE

## 2024-09-20 PROCEDURE — 1036F TOBACCO NON-USER: CPT | Performed by: FAMILY MEDICINE

## 2024-09-20 PROCEDURE — 3079F DIAST BP 80-89 MM HG: CPT | Performed by: FAMILY MEDICINE

## 2024-09-20 PROCEDURE — 80307 DRUG TEST PRSMV CHEM ANLYZR: CPT

## 2024-09-20 PROCEDURE — 3048F LDL-C <100 MG/DL: CPT | Performed by: FAMILY MEDICINE

## 2024-09-20 PROCEDURE — 3008F BODY MASS INDEX DOCD: CPT | Performed by: FAMILY MEDICINE

## 2024-09-20 PROCEDURE — 3077F SYST BP >= 140 MM HG: CPT | Performed by: FAMILY MEDICINE

## 2024-09-20 PROCEDURE — 99214 OFFICE O/P EST MOD 30 MIN: CPT | Performed by: FAMILY MEDICINE

## 2024-09-20 PROCEDURE — 3051F HG A1C>EQUAL 7.0%<8.0%: CPT | Performed by: FAMILY MEDICINE

## 2024-09-20 RX ORDER — TRAMADOL HYDROCHLORIDE 50 MG/1
50-100 TABLET ORAL 3 TIMES DAILY PRN
Qty: 180 TABLET | Refills: 5 | Status: SHIPPED | OUTPATIENT
Start: 2024-10-03

## 2024-09-20 RX ORDER — ROSUVASTATIN CALCIUM 10 MG/1
10 TABLET, COATED ORAL DAILY
Qty: 90 TABLET | Refills: 3 | Status: SHIPPED | OUTPATIENT
Start: 2024-09-20 | End: 2025-10-25

## 2024-09-20 RX ORDER — DOXYCYCLINE 100 MG/1
CAPSULE ORAL
Qty: 20 CAPSULE | Refills: 0 | Status: SHIPPED | OUTPATIENT
Start: 2024-09-20

## 2024-09-20 NOTE — PROGRESS NOTES
Subjective   Patient ID: Polo Drew is a 64 y.o. male who presents for Lab REview FU / CSA / UDS (Pt in today for routine lab review FU / urine drug screen / controlled substance contract signing.).      Review of Systems  Denies N/V/D/C, no HA/S/V, denies rashes/lesions, no CP/SOB. Denies fevers/chills.  All other systems were negative.    Objective   Physical Exam  Gen: NAD  eyes: EOMI, PERRLA  ENT: hearing grossly intact, no nasal discharge  resp: CTABL, without R/R  heart: RRR without MRG  GI: abd: S/ND/NT, BS+  lymph: no axillary, cervical, supraclavicular lymphadenopathy noted   MS: gait grossly WNL,  derm: no rashes or lesions noted  neuro: CN II-XII grossly intact  psych: A&Ox3    Assessment/Plan   Diagnoses and all orders for this visit:  Chronic low back pain, unspecified back pain laterality, unspecified whether sciatica present  -     Drug Screen, Urine With Reflex to Confirmation  Drug therapy  -     Drug Screen, Urine With Reflex to Confirmation      Patient Discussion/Summary     2025 will be next Annual wellness visit.  September 2025 will be next CSA/UDS.     -----  Need for immunizations: Has completed the shingrix series an had Tdap 2020. Is also up-to-date on annual annual flu shot as well as latest coronavirus booster.  He is due for the new RSV shot. -->>  Keep up the good work staying up-to-date on your immunizations.     Screen for prostate cancer, psa 0.51. -->> We will recheck annually as previously discussed.    Colon cancer screening -->> patient has his Cologuard test at home, which he will send in before the end of August.     screen for hepatitis C negative August 2022.   ----      BMI 24, the same as last time 6 months ago.  still down from max of 215. -->> Keep up the excellent work.      New annual labs reviewed:    - Hyperlipidemia, cholesterol numbers are great.  LDL is 50, goal is less than 70.  On atorvastatin 40. -->>  With type 2 diabetes will switch from atorvastatin  to rosuvastatin 10. Also recommend you start coq.10, 200 mg daily.  Buy the cheapest you could find it is totally good enough.  Should be able to find it for $0.20 a pill.  Be sure to look at the back of the bottle to make sure serving sizes 1 pill.  Could also get on [employer] for probably closer to $0.11 a pill.    - Type 2 diabetes, A1C 7.2, was 7.2, 6.4, 6.7, 6.2, 5.7, 6.2, 10.6, 10.3, 7.2, 7.5, 7.5, 7.6, 8.6, 8.5, 6.0 (calc from fructosamine). Patient has improved diet, still on Trulicity 4.5 mg / week injections as 40 units daily long-acting insulin.  No longer on and is longer on miglitol, farxiga.  On minor low sugar events since last appointment here.  -->>  Will discontinue Trulicity and start on Ozempic 2 mg weekly.  That should be a little stronger than the 4.5 of Trulicity.  Patient has maybe 2 months worth of Trulicity left, would like to finish it.  So finish the Trulicity, that means you will be starting the Ozempic in about 2 months.  Check in office A1c in about 4.  months.      - Vitamin D deficiency, 77, was 84, 71, 87, 94, 83, 64, 51, 36, 35. Goal is 75 - 100. Is now doing 5,000 units, 2 daily vitamin D3 6 days a week. -->> Continue current treatment. We will recheck with next annual labs    - Drug therapy. -->> next annual labs around aug 2025.    - Hypothyroidism, TSH is optimal, free T3 and free T4 within normal limits on 75 mcg daily. -->> Continue current 75 mcg daily. We'll recheck with next annual labs.     - Dehydrated, is yet, again dehydrated on these labs. Continue to drink plenty of water to help stay well-hydrated.    - Hypokalemia. 3.9 Patient is eating more potassium-rich foods. -->> Continue potassium-rich foods.         Chronic low back pain, on tramadol. Is being seen for controlled substance refill appointment today.  OARRS report reviewed.  Will refill tramadol as needed.  Next contract/UDS due around August.  -->> Will refill as needed.     Cardiac CT score is 585, patient  stress test with cardiology as inconclusive, they were planning to do a nuclear stress test but it has not been done yet -->> Patient is going to contact cardiology/Dr Cooper (or whoever) to find out if you are due for any appointments or tests.  Can try looking up [employer].     Diabetic neuropathy, usually doing well enough on 1200 mg 3 times a day of Neurontin. Failed pregabalin/Lyrica (did not work as well as АНДРЕЙ). -->> we will refill gabapentin as needed.      Hypertension, on Dyazide and losartan, blood pressure elevated here today, Patient does checked fairly regularly and gets good numbers at rest. -->> Continue current treatment.     Skin infection left arm, looks like was getting an abscess but that it is getting smaller and improving over time. -->>  Will prescribe doxycycline for 10 days just to lessen the chance of it getting worse or spreading.        - Doing UDS today.  - Return on Friday,first week of February for an office A1c and annual preventative visit.

## 2024-09-20 NOTE — PATIENT INSTRUCTIONS
2025 will be next Annual wellness visit.  September 2025 will be next CSA/UDS.     -----  Need for immunizations: Has completed the shingrix series an had Tdap 2020. Is also up-to-date on annual annual flu shot as well as latest coronavirus booster.  He is due for the new RSV shot. -->>  Keep up the good work staying up-to-date on your immunizations.     Screen for prostate cancer, psa 0.51. -->> We will recheck annually as previously discussed.    Colon cancer screening -->> patient has his Cologuard test at home, which he will send in before the end of August.     screen for hepatitis C negative August 2022.   ----      BMI 24, the same as last time 6 months ago.  still down from max of 215. -->> Keep up the excellent work.      New annual labs reviewed:    - Hyperlipidemia, cholesterol numbers are great.  LDL is 50, goal is less than 70.  On atorvastatin 40. -->>  With type 2 diabetes will switch from atorvastatin to rosuvastatin 10. Also recommend you start coq.10, 200 mg daily.  Buy the cheapest you could find it is totally good enough.  Should be able to find it for $0.20 a pill.  Be sure to look at the back of the bottle to make sure serving sizes 1 pill.  Could also get on [employer] for probably closer to $0.11 a pill.    - Type 2 diabetes, A1C 7.2, was 7.2, 6.4, 6.7, 6.2, 5.7, 6.2, 10.6, 10.3, 7.2, 7.5, 7.5, 7.6, 8.6, 8.5, 6.0 (calc from fructosamine). Patient has improved diet, still on Trulicity 4.5 mg / week injections as 40 units daily long-acting insulin.  No longer on and is longer on miglitol, farxiga.  On minor low sugar events since last appointment here.  -->>  Will discontinue Trulicity and start on Ozempic 2 mg weekly.  That should be a little stronger than the 4.5 of Trulicity.  Patient has maybe 2 months worth of Trulicity left, would like to finish it.  So finish the Trulicity, that means you will be starting the Ozempic in about 2 months.  Check in office A1c in about 4.  months.      - Vitamin  D deficiency, 77, was 84, 71, 87, 94, 83, 64, 51, 36, 35. Goal is 75 - 100. Is now doing 5,000 units, 2 daily vitamin D3 6 days a week. -->> Continue current treatment. We will recheck with next annual labs    - Drug therapy. -->> next annual labs around aug 2025.    - Hypothyroidism, TSH is optimal, free T3 and free T4 within normal limits on 75 mcg daily. -->> Continue current 75 mcg daily. We'll recheck with next annual labs.     - Dehydrated, is yet, again dehydrated on these labs. Continue to drink plenty of water to help stay well-hydrated.    - Hypokalemia. 3.9 Patient is eating more potassium-rich foods. -->> Continue potassium-rich foods.         Chronic low back pain, on tramadol. Is being seen for controlled substance refill appointment today.  OARRS report reviewed.  Will refill tramadol as needed.  Next contract/UDS due around August.  -->> Will refill as needed.     Cardiac CT score is 585, patient stress test with cardiology as inconclusive, they were planning to do a nuclear stress test but it has not been done yet -->> Patient is going to contact cardiology/Dr Cooper (or whoever) to find out if you are due for any appointments or tests.  Can try looking up [place].     Diabetic neuropathy, usually doing well enough on 1200 mg 3 times a day of Neurontin. Failed pregabalin/Lyrica (did not work as well as АНДРЕЙ). -->> we will refill gabapentin as needed.      Hypertension, on Dyazide and losartan, blood pressure elevated here today, Patient does checked fairly regularly and gets good numbers at rest. -->> Continue current treatment.     Skin infection left arm, looks like was getting an abscess but that it is getting smaller and improving over time. -->>  Will prescribe doxycycline for 10 days just to lessen the chance of it getting worse or spreading.        - Doing UDS today.  - Return on Friday,first week of February for an office A1c and annual preventative visit.

## 2024-10-01 DIAGNOSIS — E11.9 TYPE 2 DIABETES MELLITUS WITHOUT COMPLICATION, WITH LONG-TERM CURRENT USE OF INSULIN (MULTI): ICD-10-CM

## 2024-10-01 DIAGNOSIS — Z79.4 TYPE 2 DIABETES MELLITUS WITHOUT COMPLICATION, WITH LONG-TERM CURRENT USE OF INSULIN (MULTI): ICD-10-CM

## 2024-10-02 RX ORDER — INSULIN GLARGINE 100 [IU]/ML
INJECTION, SOLUTION SUBCUTANEOUS
Qty: 45 ML | Refills: 0 | Status: SHIPPED | OUTPATIENT
Start: 2024-10-02

## 2024-10-29 ENCOUNTER — TELEPHONE (OUTPATIENT)
Dept: PRIMARY CARE | Facility: CLINIC | Age: 64
End: 2024-10-29
Payer: COMMERCIAL

## 2024-10-31 DIAGNOSIS — Z79.4 TYPE 2 DIABETES MELLITUS WITH OTHER SPECIFIED COMPLICATION, WITH LONG-TERM CURRENT USE OF INSULIN: Primary | ICD-10-CM

## 2024-10-31 DIAGNOSIS — E11.69 TYPE 2 DIABETES MELLITUS WITH OTHER SPECIFIED COMPLICATION, WITH LONG-TERM CURRENT USE OF INSULIN: Primary | ICD-10-CM

## 2024-10-31 RX ORDER — BLOOD-GLUCOSE SENSOR
EACH MISCELLANEOUS
Qty: 10 EACH | Refills: 3 | Status: SHIPPED | OUTPATIENT
Start: 2024-10-31

## 2025-01-09 DIAGNOSIS — Z79.4 TYPE 2 DIABETES MELLITUS WITHOUT COMPLICATION, WITH LONG-TERM CURRENT USE OF INSULIN (MULTI): ICD-10-CM

## 2025-01-09 DIAGNOSIS — E11.9 TYPE 2 DIABETES MELLITUS WITHOUT COMPLICATION, WITH LONG-TERM CURRENT USE OF INSULIN (MULTI): ICD-10-CM

## 2025-01-09 RX ORDER — INSULIN GLARGINE 100 [IU]/ML
INJECTION, SOLUTION SUBCUTANEOUS
Qty: 45 ML | Refills: 0 | Status: SHIPPED | OUTPATIENT
Start: 2025-01-09

## 2025-02-04 ENCOUNTER — TELEPHONE (OUTPATIENT)
Dept: PRIMARY CARE | Facility: CLINIC | Age: 65
End: 2025-02-04
Payer: COMMERCIAL

## 2025-02-05 DIAGNOSIS — Z79.4 TYPE 2 DIABETES MELLITUS WITH OTHER SPECIFIED COMPLICATION, WITH LONG-TERM CURRENT USE OF INSULIN: Primary | ICD-10-CM

## 2025-02-05 DIAGNOSIS — E11.69 TYPE 2 DIABETES MELLITUS WITH OTHER SPECIFIED COMPLICATION, WITH LONG-TERM CURRENT USE OF INSULIN: Primary | ICD-10-CM

## 2025-02-05 RX ORDER — FLASH GLUCOSE SENSOR
KIT MISCELLANEOUS
Qty: 6 EACH | Refills: 3 | Status: SHIPPED | OUTPATIENT
Start: 2025-02-05

## 2025-02-05 RX ORDER — FLASH GLUCOSE SCANNING READER
EACH MISCELLANEOUS
Qty: 1 EACH | Refills: 0 | Status: SHIPPED | OUTPATIENT
Start: 2025-02-05

## 2025-02-14 ENCOUNTER — APPOINTMENT (OUTPATIENT)
Dept: PRIMARY CARE | Facility: CLINIC | Age: 65
End: 2025-02-14
Payer: COMMERCIAL

## 2025-03-07 ENCOUNTER — APPOINTMENT (OUTPATIENT)
Dept: PRIMARY CARE | Facility: CLINIC | Age: 65
End: 2025-03-07
Payer: COMMERCIAL

## 2025-03-07 VITALS
BODY MASS INDEX: 23.52 KG/M2 | OXYGEN SATURATION: 96 % | WEIGHT: 168 LBS | DIASTOLIC BLOOD PRESSURE: 74 MMHG | SYSTOLIC BLOOD PRESSURE: 145 MMHG | HEART RATE: 70 BPM | HEIGHT: 71 IN

## 2025-03-07 DIAGNOSIS — Z00.00 PREVENTATIVE HEALTH CARE: ICD-10-CM

## 2025-03-07 DIAGNOSIS — E78.5 HYPERLIPIDEMIA, UNSPECIFIED HYPERLIPIDEMIA TYPE: ICD-10-CM

## 2025-03-07 DIAGNOSIS — E11.69 TYPE 2 DIABETES MELLITUS WITH OTHER SPECIFIED COMPLICATION, WITH LONG-TERM CURRENT USE OF INSULIN: ICD-10-CM

## 2025-03-07 DIAGNOSIS — R21 RASH: ICD-10-CM

## 2025-03-07 DIAGNOSIS — Z12.5 SCREENING FOR PROSTATE CANCER: ICD-10-CM

## 2025-03-07 DIAGNOSIS — E03.9 HYPOTHYROIDISM, UNSPECIFIED TYPE: ICD-10-CM

## 2025-03-07 DIAGNOSIS — Z79.4 TYPE 2 DIABETES MELLITUS WITH OTHER SPECIFIED COMPLICATION, WITH LONG-TERM CURRENT USE OF INSULIN: ICD-10-CM

## 2025-03-07 DIAGNOSIS — G89.29 CHRONIC LOW BACK PAIN, UNSPECIFIED BACK PAIN LATERALITY, UNSPECIFIED WHETHER SCIATICA PRESENT: ICD-10-CM

## 2025-03-07 DIAGNOSIS — G89.29 OTHER CHRONIC PAIN: ICD-10-CM

## 2025-03-07 DIAGNOSIS — Z79.899 DRUG THERAPY: ICD-10-CM

## 2025-03-07 DIAGNOSIS — Z13.9 SCREENING FOR CONDITION: ICD-10-CM

## 2025-03-07 DIAGNOSIS — Z53.20 COLONOSCOPY REFUSED: Primary | ICD-10-CM

## 2025-03-07 DIAGNOSIS — E55.9 VITAMIN D DEFICIENCY: ICD-10-CM

## 2025-03-07 DIAGNOSIS — M54.50 CHRONIC LOW BACK PAIN, UNSPECIFIED BACK PAIN LATERALITY, UNSPECIFIED WHETHER SCIATICA PRESENT: ICD-10-CM

## 2025-03-07 PROCEDURE — 1036F TOBACCO NON-USER: CPT | Performed by: FAMILY MEDICINE

## 2025-03-07 PROCEDURE — 99396 PREV VISIT EST AGE 40-64: CPT | Performed by: FAMILY MEDICINE

## 2025-03-07 PROCEDURE — 3078F DIAST BP <80 MM HG: CPT | Performed by: FAMILY MEDICINE

## 2025-03-07 PROCEDURE — 3008F BODY MASS INDEX DOCD: CPT | Performed by: FAMILY MEDICINE

## 2025-03-07 PROCEDURE — 4010F ACE/ARB THERAPY RXD/TAKEN: CPT | Performed by: FAMILY MEDICINE

## 2025-03-07 PROCEDURE — 99214 OFFICE O/P EST MOD 30 MIN: CPT | Performed by: FAMILY MEDICINE

## 2025-03-07 PROCEDURE — 3077F SYST BP >= 140 MM HG: CPT | Performed by: FAMILY MEDICINE

## 2025-03-07 RX ORDER — MUPIROCIN 20 MG/G
OINTMENT TOPICAL 3 TIMES DAILY
Qty: 22 G | Refills: 1 | Status: SHIPPED | OUTPATIENT
Start: 2025-03-07 | End: 2025-03-17

## 2025-03-07 RX ORDER — TRAMADOL HYDROCHLORIDE 50 MG/1
50-100 TABLET ORAL 3 TIMES DAILY PRN
Qty: 180 TABLET | Refills: 5 | Status: SHIPPED | OUTPATIENT
Start: 2025-03-07

## 2025-03-07 NOTE — PATIENT INSTRUCTIONS
Annual wellness visit.      September 2025 will be next CSA/UDS.     -----    Immunization counseling: Has completed the shingrix series an had Tdap 2020. Is also up-to-date on annual annual flu shot as well as latest coronavirus booster.  Also up-to-date on the pneumonia shot.  He is due for the new RSV shot. -->>  Check with your pharmacy to keep up-to-date on immunizations.     Screen for prostate cancer, psa 0.51. -->> We will recheck annually as previously discussed.    Colon cancer screening -->> patient refuses colon cancer screening.      screen for hepatitis C negative August 2022.     ----      BMI 23, down 6 pounds since last appointment here.  Still down from max of 215. -->> Keep up the excellent work.      New A1C reviewed:    - Type 2 diabetes, A1C 6.1, was 7.2, 7.2, 6.4, 6.7, 6.2, 5.7, 6.2, 10.6, 10.3, 7.2, 7.5, 7.5, 7.6, 8.6, 8.5, 6.0 (calc from fructosamine). Patient has improved diet, currently on Ozempic 2 mg weekly.  It appears superior to the Trulicity. Also injecting 40 units daily long-acting insulin.  No longer on miglitol, farxiga.  -->> Continue current therapy.  Recheck in 3 - 4 months.          Regarding previous labs:    - Hyperlipidemia, cholesterol numbers are great.  LDL is 50, goal is less than 70.  On atorvastatin 40. -->>  With type 2 diabetes will switch from atorvastatin to rosuvastatin 10. Also recommend you start coq.10, 200 mg daily.  Buy the cheapest you could find it is totally good enough.  Should be able to find it for $0.20 a pill.  Be sure to look at the back of the bottle to make sure serving sizes 1 pill.  Could also get on [employer] for probably closer to $0.11 a pill.    - Vitamin D deficiency, 77, was 84, 71, 87, 94, 83, 64, 51, 36, 35. Goal is 75 - 100. Is now doing 5,000 units, 2 daily vitamin D3 6 days a week. -->> Continue current treatment. We will recheck with next annual labs    - Drug therapy. -->> next annual labs around aug 2025.    - Hypothyroidism, TSH is  optimal, free T3 and free T4 within normal limits on 75 mcg daily. -->> Continue current 75 mcg daily. We'll recheck with next annual labs.     - Dehydrated, is yet, again dehydrated on these labs. Continue to drink plenty of water to help stay well-hydrated.    - Hypokalemia. 3.9 Patient is eating more potassium-rich foods. -->> Continue potassium-rich foods.         Chronic low back pain, on tramadol. Is being seen for controlled substance refill appointment today.  OARRS report reviewed.  Will refill tramadol as needed.  Next contract/UDS due around August.  -->> Will refill as needed.     Cardiac CT score is 585, patient stress test with cardiology as inconclusive, they were planning to do a nuclear stress test but it has not been done yet -->> Patient is going to contact cardiology/Dr Cooper (or whoever) to find out if you are due for any appointments or tests.  Can try looking up [place].     Diabetic neuropathy, usually doing well enough on 1200 mg 3 times a day of Neurontin. Failed pregabalin/Lyrica (did not work as well as АНДРЕЙ). -->> we will refill gabapentin as needed.      Hypertension, on Dyazide and losartan, blood pressure borderline Here today, Patient does checked fairly regularly and gets good numbers at rest. -->> Continue current treatment.     Rash, medial right lower leg. Not itchy.  Has had this for 5 or 6 months.  -->>  Will try prescribing mupirocin ointment.  If that does not help after 10 days or so, try an over-the-counter antifungal like Lotrimin or Lamisil.  If that is also ineffective after 7 to 10 days, can try hydrocortisone or other topical steroid.        - Will schedule patient with his wife's appointments in August to come in for annual labs with a follow-up a week later for UDS/CSA.

## 2025-03-07 NOTE — PROGRESS NOTES
Subjective   Patient ID: Polo Drew is a 64 y.o. male who presents for Annual Preventative (Pt in today for annual preventative).      Review of Systems  Denies N/V/D/C, no HA/S/V, denies rashes/lesions, no CP/SOB. Denies fevers/chills.  All other systems were negative.    Objective   Physical Exam  Gen: NAD  eyes: EOMI, PERRLA  ENT: hearing grossly intact, no nasal discharge  resp: CTABL, without R/R  heart: RRR without MRG  GI: abd: S/ND/NT, BS+  lymph: no axillary, cervical, supraclavicular lymphadenopathy noted   MS: gait grossly WNL,  derm: no rashes or lesions noted  neuro: CN II-XII grossly intact  psych: A&Ox3    Assessment/Plan   Diagnoses and all orders for this visit:  Colonoscopy refused  Other chronic pain  -     traMADol (Ultram) 50 mg tablet; Take 1-2 tablets ( mg) by mouth 3 times a day as needed for severe pain (7 - 10) or moderate pain (4 - 6).  Preventative health care  -     traMADol (Ultram) 50 mg tablet; Take 1-2 tablets ( mg) by mouth 3 times a day as needed for severe pain (7 - 10) or moderate pain (4 - 6).  Rash  -     mupirocin (Bactroban) 2 % ointment; Apply topically 3 times a day for 10 days. Like to rash on the leg  Chronic low back pain, unspecified back pain laterality, unspecified whether sciatica present  Drug therapy  -     CBC and Auto Differential; Future  -     Comprehensive Metabolic Panel; Future  -     Magnesium; Future  -     Urinalysis with Reflex Microscopic; Future  Vitamin D deficiency  -     Vitamin D 25-Hydroxy,Total (for eval of Vitamin D levels); Future  Hypothyroidism, unspecified type  -     Thyroxine, Free; Future  -     Triiodothyronine, Free; Future  -     Thyroid Stimulating Hormone; Future  Screening for condition  Type 2 diabetes mellitus with other specified complication, with long-term current use of insulin  -     Hemoglobin A1C; Future  Hyperlipidemia, unspecified hyperlipidemia type  -     Lipid Panel; Future  Screening for prostate  cancer  -     Prostate Spec.Ag,Screen; Future        Patient Discussion/Summary     Annual wellness visit.      September 2025 will be next CSA/UDS.     -----    Immunization counseling: Has completed the shingrix series an had Tdap 2020. Is also up-to-date on annual annual flu shot as well as latest coronavirus booster.  Also up-to-date on the pneumonia shot.  He is due for the new RSV shot. -->>  Check with your pharmacy to keep up-to-date on immunizations.     Screen for prostate cancer, psa 0.51. -->> We will recheck annually as previously discussed.    Colon cancer screening -->> patient refuses colon cancer screening.      screen for hepatitis C negative August 2022.     ----      BMI 23, down 6 pounds since last appointment here.  Still down from max of 215. -->> Keep up the excellent work.      New A1C reviewed:    - Type 2 diabetes, A1C 6.1, was 7.2, 7.2, 6.4, 6.7, 6.2, 5.7, 6.2, 10.6, 10.3, 7.2, 7.5, 7.5, 7.6, 8.6, 8.5, 6.0 (calc from fructosamine). Patient has improved diet, currently on Ozempic 2 mg weekly.  It appears superior to the Trulicity. Also injecting 40 units daily long-acting insulin.  No longer on miglitol, farxiga.  -->> Continue current therapy.  Recheck in 3 - 4 months.          Regarding previous labs:    - Hyperlipidemia, cholesterol numbers are great.  LDL is 50, goal is less than 70.  On atorvastatin 40. -->>  With type 2 diabetes will switch from atorvastatin to rosuvastatin 10. Also recommend you start coq.10, 200 mg daily.  Buy the cheapest you could find it is totally good enough.  Should be able to find it for $0.20 a pill.  Be sure to look at the back of the bottle to make sure serving sizes 1 pill.  Could also get on [employer] for probably closer to $0.11 a pill.    - Vitamin D deficiency, 77, was 84, 71, 87, 94, 83, 64, 51, 36, 35. Goal is 75 - 100. Is now doing 5,000 units, 2 daily vitamin D3 6 days a week. -->> Continue current treatment. We will recheck with next annual  labs    - Drug therapy. -->> next annual labs around aug 2025.    - Hypothyroidism, TSH is optimal, free T3 and free T4 within normal limits on 75 mcg daily. -->> Continue current 75 mcg daily. We'll recheck with next annual labs.     - Dehydrated, is yet, again dehydrated on these labs. Continue to drink plenty of water to help stay well-hydrated.    - Hypokalemia. 3.9 Patient is eating more potassium-rich foods. -->> Continue potassium-rich foods.         Chronic low back pain, on tramadol. Is being seen for controlled substance refill appointment today.  OARRS report reviewed.  Will refill tramadol as needed.  Next contract/UDS due around August.  -->> Will refill as needed.     Cardiac CT score is 585, patient stress test with cardiology as inconclusive, they were planning to do a nuclear stress test but it has not been done yet -->> Patient is going to contact cardiology/Dr Cooper (or whoever) to find out if you are due for any appointments or tests.  Can try looking up [place].     Diabetic neuropathy, usually doing well enough on 1200 mg 3 times a day of Neurontin. Failed pregabalin/Lyrica (did not work as well as АНДРЕЙ). -->> we will refill gabapentin as needed.      Hypertension, on Dyazide and losartan, blood pressure borderline Here today, Patient does checked fairly regularly and gets good numbers at rest. -->> Continue current treatment.     Rash, medial right lower leg. Not itchy.  Has had this for 5 or 6 months.  -->>  Will try prescribing mupirocin ointment.  If that does not help after 10 days or so, try an over-the-counter antifungal like Lotrimin or Lamisil.  If that is also ineffective after 7 to 10 days, can try hydrocortisone or other topical steroid.        - Will schedule patient with his wife's appointments in August to come in for annual labs with a follow-up a week later for UDS/CSA.

## 2025-03-21 DIAGNOSIS — M54.10 RADICULOPATHY, UNSPECIFIED SPINAL REGION: ICD-10-CM

## 2025-03-24 DIAGNOSIS — G89.29 OTHER CHRONIC PAIN: ICD-10-CM

## 2025-03-24 DIAGNOSIS — Z00.00 PREVENTATIVE HEALTH CARE: ICD-10-CM

## 2025-03-24 DIAGNOSIS — M54.10 RADICULOPATHY, UNSPECIFIED SPINAL REGION: ICD-10-CM

## 2025-03-24 RX ORDER — TRAMADOL HYDROCHLORIDE 50 MG/1
50-100 TABLET ORAL 3 TIMES DAILY PRN
Qty: 180 TABLET | Refills: 5 | Status: SHIPPED | OUTPATIENT
Start: 2025-03-24

## 2025-03-24 RX ORDER — GABAPENTIN 600 MG/1
1200 TABLET ORAL 3 TIMES DAILY
Qty: 540 TABLET | Refills: 3 | Status: SHIPPED | OUTPATIENT
Start: 2025-03-24

## 2025-03-24 RX ORDER — GABAPENTIN 600 MG/1
1200 TABLET ORAL 3 TIMES DAILY
Qty: 540 TABLET | Refills: 3 | OUTPATIENT
Start: 2025-03-24

## 2025-04-04 DIAGNOSIS — Z79.4 TYPE 2 DIABETES MELLITUS WITHOUT COMPLICATION, WITH LONG-TERM CURRENT USE OF INSULIN: ICD-10-CM

## 2025-04-04 DIAGNOSIS — E11.9 TYPE 2 DIABETES MELLITUS WITHOUT COMPLICATION, WITH LONG-TERM CURRENT USE OF INSULIN: ICD-10-CM

## 2025-04-07 RX ORDER — INSULIN GLARGINE 100 [IU]/ML
INJECTION, SOLUTION SUBCUTANEOUS
Qty: 45 ML | Refills: 11 | Status: SHIPPED | OUTPATIENT
Start: 2025-04-07

## 2025-04-18 ENCOUNTER — TELEPHONE (OUTPATIENT)
Dept: PRIMARY CARE | Facility: CLINIC | Age: 65
End: 2025-04-18
Payer: COMMERCIAL

## 2025-04-18 NOTE — TELEPHONE ENCOUNTER
Sorry, I am not sure what this is about.  Does he want a referral to a psychologist to do a drug and alcohol assessment.  I am going to suggest at this moment that we talk about it in May at our next appointment if that is okay? thanks

## 2025-05-09 ENCOUNTER — APPOINTMENT (OUTPATIENT)
Dept: PRIMARY CARE | Facility: CLINIC | Age: 65
End: 2025-05-09
Payer: COMMERCIAL

## 2025-06-12 ENCOUNTER — TELEMEDICINE (OUTPATIENT)
Dept: PRIMARY CARE | Facility: CLINIC | Age: 65
End: 2025-06-12
Payer: COMMERCIAL

## 2025-06-12 DIAGNOSIS — J20.9 ACUTE BRONCHITIS, UNSPECIFIED ORGANISM: ICD-10-CM

## 2025-06-12 DIAGNOSIS — L08.9 SKIN INFECTION: ICD-10-CM

## 2025-06-12 DIAGNOSIS — J01.00 ACUTE MAXILLARY SINUSITIS, RECURRENCE NOT SPECIFIED: Primary | ICD-10-CM

## 2025-06-12 PROCEDURE — 99214 OFFICE O/P EST MOD 30 MIN: CPT | Performed by: FAMILY MEDICINE

## 2025-06-12 PROCEDURE — 4010F ACE/ARB THERAPY RXD/TAKEN: CPT | Performed by: FAMILY MEDICINE

## 2025-06-12 RX ORDER — FLUTICASONE PROPIONATE 50 MCG
SPRAY, SUSPENSION (ML) NASAL
Qty: 16 G | Refills: 5 | Status: SHIPPED | OUTPATIENT
Start: 2025-06-12

## 2025-06-12 RX ORDER — GUAIFENESIN 1200 MG/1
TABLET, EXTENDED RELEASE ORAL
Qty: 28 TABLET | Refills: 2 | Status: SHIPPED | OUTPATIENT
Start: 2025-06-12

## 2025-06-12 RX ORDER — DOXYCYCLINE 100 MG/1
CAPSULE ORAL
Qty: 20 CAPSULE | Refills: 0 | Status: SHIPPED | OUTPATIENT
Start: 2025-06-12

## 2025-06-12 NOTE — PATIENT INSTRUCTIONS
Acute maxillary sinusitis, with acute bronchitis. Been having symptoms for about a week including maxillary pain and pressure and productive cough.  Has been taking Coricidin for high blood pressure.  With type 2 diabetes patient is more at risk for adverse outcomes. -->>  Prescribed Augmentin doxycycline, Mucinex, fluticasone nasal spray.  He did discuss that these may last 6 or more weeks, and if the antibiotics do not help its usually because it is a viral.          Return in August as already scheduled.

## 2025-06-12 NOTE — PROGRESS NOTES
Subjective   Patient ID: Polo Drew is a 65 y.o. male who presents for Virtual Visit (Pt being seen virtually for c/o cough, congestion.).      Review of Systems  Denies N/V/D/C, no HA/S/V, denies rashes/lesions, no CP/SOB. Denies fevers/chills.  All other systems were negative.    Objective   Physical Exam  Gen: NAD  eyes: EOMI, PERRLA  ENT: hearing grossly intact, no nasal discharge  resp: CTABL, without R/R  heart: RRR without MRG  GI: abd: S/ND/NT, BS+  lymph: no axillary, cervical, supraclavicular lymphadenopathy noted   MS: gait grossly WNL,  derm: no rashes or lesions noted  neuro: CN II-XII grossly intact  psych: A&Ox3    Assessment/Plan   There are no diagnoses linked to this encounter.        Patient Discussion/Summary     Virtual or Telephone Consent    An interactive audio and video telecommunication system which permits real time communications between the patient (at the originating site) and provider (at the distant site) was utilized to provide this telehealth service.   Verbal consent was requested and obtained from Polo Drew on this date, 06/12/25 for a telehealth visit and the patient's location was confirmed at the time of the visit.    Acute maxillary sinusitis, with acute bronchitis. Been having symptoms for about a week including maxillary pain and pressure and productive cough.  Has been taking Coricidin for high blood pressure.  With type 2 diabetes patient is more at risk for adverse outcomes. -->>  Prescribed Augmentin doxycycline, Mucinex, fluticasone nasal spray.  He did discuss that these may last 6 or more weeks, and if the antibiotics do not help its usually because it is a viral.          Return in August as already scheduled.

## 2025-07-14 DIAGNOSIS — E03.9 HYPOTHYROIDISM, UNSPECIFIED TYPE: ICD-10-CM

## 2025-07-15 RX ORDER — LEVOTHYROXINE SODIUM 75 UG/1
75 TABLET ORAL DAILY
Qty: 90 TABLET | Refills: 3 | Status: SHIPPED | OUTPATIENT
Start: 2025-07-15

## 2025-08-08 ENCOUNTER — APPOINTMENT (OUTPATIENT)
Dept: PRIMARY CARE | Facility: CLINIC | Age: 65
End: 2025-08-08
Payer: COMMERCIAL

## 2025-08-08 DIAGNOSIS — E55.9 VITAMIN D DEFICIENCY: ICD-10-CM

## 2025-08-08 DIAGNOSIS — E03.9 HYPOTHYROIDISM, UNSPECIFIED TYPE: ICD-10-CM

## 2025-08-08 DIAGNOSIS — E78.5 HYPERLIPIDEMIA, UNSPECIFIED HYPERLIPIDEMIA TYPE: ICD-10-CM

## 2025-08-08 DIAGNOSIS — E11.69 TYPE 2 DIABETES MELLITUS WITH OTHER SPECIFIED COMPLICATION, WITH LONG-TERM CURRENT USE OF INSULIN: ICD-10-CM

## 2025-08-08 DIAGNOSIS — Z79.899 DRUG THERAPY: ICD-10-CM

## 2025-08-08 DIAGNOSIS — Z12.5 SCREENING FOR PROSTATE CANCER: ICD-10-CM

## 2025-08-08 DIAGNOSIS — Z79.4 TYPE 2 DIABETES MELLITUS WITH OTHER SPECIFIED COMPLICATION, WITH LONG-TERM CURRENT USE OF INSULIN: ICD-10-CM

## 2025-08-08 NOTE — PROGRESS NOTES
Subjective   Patient ID: Polo Drew is a 65 y.o. male who presents for Labs Only (Pt in today for lab draw).    HPI     Review of Systems    Objective   There were no vitals taken for this visit.    Physical Exam    Assessment/Plan

## 2025-08-09 LAB
25(OH)D3+25(OH)D2 SERPL-MCNC: 83 NG/ML (ref 30–100)
ALBUMIN SERPL-MCNC: 4.5 G/DL (ref 3.6–5.1)
ALBUMIN/CREAT UR: 10 MG/G CREAT
ALP SERPL-CCNC: 56 U/L (ref 35–144)
ALT SERPL-CCNC: 19 U/L (ref 9–46)
ANION GAP SERPL CALCULATED.4IONS-SCNC: 13 MMOL/L (CALC) (ref 7–17)
APPEARANCE UR: CLEAR
AST SERPL-CCNC: 21 U/L (ref 10–35)
BASOPHILS # BLD AUTO: 53 CELLS/UL (ref 0–200)
BASOPHILS NFR BLD AUTO: 0.6 %
BILIRUB SERPL-MCNC: 0.6 MG/DL (ref 0.2–1.2)
BILIRUB UR QL STRIP: NEGATIVE
BUN SERPL-MCNC: 18 MG/DL (ref 7–25)
CALCIUM SERPL-MCNC: 9.2 MG/DL (ref 8.6–10.3)
CHLORIDE SERPL-SCNC: 97 MMOL/L (ref 98–110)
CHOLEST SERPL-MCNC: 116 MG/DL
CHOLEST/HDLC SERPL: 2 (CALC)
CO2 SERPL-SCNC: 28 MMOL/L (ref 20–32)
COLOR UR: YELLOW
CREAT SERPL-MCNC: 0.64 MG/DL (ref 0.7–1.35)
CREAT UR-MCNC: 69 MG/DL (ref 20–320)
EGFRCR SERPLBLD CKD-EPI 2021: 106 ML/MIN/1.73M2
EOSINOPHIL # BLD AUTO: 142 CELLS/UL (ref 15–500)
EOSINOPHIL NFR BLD AUTO: 1.6 %
ERYTHROCYTE [DISTWIDTH] IN BLOOD BY AUTOMATED COUNT: 13.1 % (ref 11–15)
EST. AVERAGE GLUCOSE BLD GHB EST-MCNC: 140 MG/DL
EST. AVERAGE GLUCOSE BLD GHB EST-SCNC: 7.7 MMOL/L
GLUCOSE SERPL-MCNC: 100 MG/DL (ref 65–99)
GLUCOSE UR QL STRIP: NEGATIVE
HBA1C MFR BLD: 6.5 %
HCT VFR BLD AUTO: 45.5 % (ref 38.5–50)
HDLC SERPL-MCNC: 58 MG/DL
HGB BLD-MCNC: 14.8 G/DL (ref 13.2–17.1)
HGB UR QL STRIP: NEGATIVE
KETONES UR QL STRIP: NEGATIVE
LDLC SERPL CALC-MCNC: 41 MG/DL (CALC)
LEUKOCYTE ESTERASE UR QL STRIP: NEGATIVE
LYMPHOCYTES # BLD AUTO: 2768 CELLS/UL (ref 850–3900)
LYMPHOCYTES NFR BLD AUTO: 31.1 %
MAGNESIUM SERPL-MCNC: 2.4 MG/DL (ref 1.5–2.5)
MCH RBC QN AUTO: 30.1 PG (ref 27–33)
MCHC RBC AUTO-ENTMCNC: 32.5 G/DL (ref 32–36)
MCV RBC AUTO: 92.7 FL (ref 80–100)
MICROALBUMIN UR-MCNC: 0.7 MG/DL
MONOCYTES # BLD AUTO: 819 CELLS/UL (ref 200–950)
MONOCYTES NFR BLD AUTO: 9.2 %
NEUTROPHILS # BLD AUTO: 5118 CELLS/UL (ref 1500–7800)
NEUTROPHILS NFR BLD AUTO: 57.5 %
NITRITE UR QL STRIP: NEGATIVE
NONHDLC SERPL-MCNC: 58 MG/DL (CALC)
PH UR STRIP: 6.5 [PH] (ref 5–8)
PLATELET # BLD AUTO: 281 THOUSAND/UL (ref 140–400)
PMV BLD REES-ECKER: 10.3 FL (ref 7.5–12.5)
POTASSIUM SERPL-SCNC: 3.6 MMOL/L (ref 3.5–5.3)
PROT SERPL-MCNC: 7.2 G/DL (ref 6.1–8.1)
PROT UR QL STRIP: NEGATIVE
PSA SERPL-MCNC: 0.44 NG/ML
RBC # BLD AUTO: 4.91 MILLION/UL (ref 4.2–5.8)
SODIUM SERPL-SCNC: 138 MMOL/L (ref 135–146)
SP GR UR STRIP: 1.02 (ref 1–1.03)
T3FREE SERPL-MCNC: 3.7 PG/ML (ref 2.3–4.2)
T4 FREE SERPL-MCNC: 1.3 NG/DL (ref 0.8–1.8)
TRIGL SERPL-MCNC: 89 MG/DL
TSH SERPL-ACNC: 0.73 MIU/L (ref 0.4–4.5)
WBC # BLD AUTO: 8.9 THOUSAND/UL (ref 3.8–10.8)

## 2025-08-15 ENCOUNTER — APPOINTMENT (OUTPATIENT)
Dept: PRIMARY CARE | Facility: CLINIC | Age: 65
End: 2025-08-15
Payer: COMMERCIAL

## 2025-08-15 VITALS
HEIGHT: 71 IN | HEART RATE: 69 BPM | BODY MASS INDEX: 23.8 KG/M2 | DIASTOLIC BLOOD PRESSURE: 74 MMHG | SYSTOLIC BLOOD PRESSURE: 160 MMHG | OXYGEN SATURATION: 95 % | WEIGHT: 170 LBS

## 2025-08-15 DIAGNOSIS — G89.29 OTHER CHRONIC PAIN: ICD-10-CM

## 2025-08-15 DIAGNOSIS — Z53.20 COLONOSCOPY REFUSED: ICD-10-CM

## 2025-08-15 DIAGNOSIS — E78.5 HYPERLIPIDEMIA, UNSPECIFIED HYPERLIPIDEMIA TYPE: ICD-10-CM

## 2025-08-15 DIAGNOSIS — E55.9 VITAMIN D DEFICIENCY: ICD-10-CM

## 2025-08-15 DIAGNOSIS — Z79.4 TYPE 2 DIABETES MELLITUS WITH OTHER SPECIFIED COMPLICATION, WITH LONG-TERM CURRENT USE OF INSULIN: ICD-10-CM

## 2025-08-15 DIAGNOSIS — Z79.899 DRUG THERAPY: ICD-10-CM

## 2025-08-15 DIAGNOSIS — E03.9 HYPOTHYROIDISM, UNSPECIFIED TYPE: Primary | ICD-10-CM

## 2025-08-15 DIAGNOSIS — I10 HYPERTENSION, UNSPECIFIED TYPE: ICD-10-CM

## 2025-08-15 DIAGNOSIS — E11.69 TYPE 2 DIABETES MELLITUS WITH OTHER SPECIFIED COMPLICATION, WITH LONG-TERM CURRENT USE OF INSULIN: ICD-10-CM

## 2025-08-15 DIAGNOSIS — Z12.5 SCREENING FOR PROSTATE CANCER: ICD-10-CM

## 2025-08-15 DIAGNOSIS — Z00.00 PREVENTATIVE HEALTH CARE: ICD-10-CM

## 2025-08-15 PROCEDURE — 4010F ACE/ARB THERAPY RXD/TAKEN: CPT | Performed by: FAMILY MEDICINE

## 2025-08-15 PROCEDURE — 3078F DIAST BP <80 MM HG: CPT | Performed by: FAMILY MEDICINE

## 2025-08-15 PROCEDURE — 3008F BODY MASS INDEX DOCD: CPT | Performed by: FAMILY MEDICINE

## 2025-08-15 PROCEDURE — 99214 OFFICE O/P EST MOD 30 MIN: CPT | Performed by: FAMILY MEDICINE

## 2025-08-15 PROCEDURE — 3077F SYST BP >= 140 MM HG: CPT | Performed by: FAMILY MEDICINE

## 2025-08-15 RX ORDER — TRAMADOL HYDROCHLORIDE 50 MG/1
50-100 TABLET, FILM COATED ORAL 3 TIMES DAILY PRN
Qty: 180 TABLET | Refills: 5 | Status: SHIPPED | OUTPATIENT
Start: 2025-08-29

## 2025-08-15 ASSESSMENT — PATIENT HEALTH QUESTIONNAIRE - PHQ9
SUM OF ALL RESPONSES TO PHQ9 QUESTIONS 1 AND 2: 0
2. FEELING DOWN, DEPRESSED OR HOPELESS: NOT AT ALL
1. LITTLE INTEREST OR PLEASURE IN DOING THINGS: NOT AT ALL

## 2025-08-15 ASSESSMENT — ENCOUNTER SYMPTOMS
OCCASIONAL FEELINGS OF UNSTEADINESS: 0
LOSS OF SENSATION IN FEET: 0
DEPRESSION: 0

## 2025-08-16 LAB
AMPHETAMINES UR QL: NEGATIVE NG/ML
BARBITURATES UR QL: NEGATIVE NG/ML
BENZODIAZ UR QL: NEGATIVE NG/ML
BZE UR QL: NEGATIVE NG/ML
CREAT UR-MCNC: 84.8 MG/DL
FENTANYL UR QL SCN: NEGATIVE NG/ML
METHADONE UR QL: NEGATIVE NG/ML
OPIATES UR QL: NEGATIVE NG/ML
OXIDANTS UR QL: NEGATIVE MCG/ML
OXYCODONE UR QL: NEGATIVE NG/ML
PCP UR QL: NEGATIVE NG/ML
PH UR: 5.3 [PH] (ref 4.5–9)
QUEST NOTES AND COMMENTS: NORMAL
THC UR QL: NEGATIVE NG/ML

## 2025-08-28 DIAGNOSIS — Z79.4 TYPE 2 DIABETES MELLITUS WITH OTHER SPECIFIED COMPLICATION, WITH LONG-TERM CURRENT USE OF INSULIN: ICD-10-CM

## 2025-08-28 DIAGNOSIS — E78.5 HYPERLIPIDEMIA, UNSPECIFIED HYPERLIPIDEMIA TYPE: ICD-10-CM

## 2025-08-28 DIAGNOSIS — E11.69 TYPE 2 DIABETES MELLITUS WITH OTHER SPECIFIED COMPLICATION, WITH LONG-TERM CURRENT USE OF INSULIN: ICD-10-CM

## 2025-08-29 RX ORDER — ROSUVASTATIN CALCIUM 10 MG/1
10 TABLET, COATED ORAL DAILY
Qty: 90 TABLET | Refills: 3 | Status: SHIPPED | OUTPATIENT
Start: 2025-08-29

## 2025-08-29 RX ORDER — SEMAGLUTIDE 2.68 MG/ML
INJECTION, SOLUTION SUBCUTANEOUS
Qty: 3 ML | Refills: 11 | Status: SHIPPED | OUTPATIENT
Start: 2025-08-29

## 2025-11-07 ENCOUNTER — APPOINTMENT (OUTPATIENT)
Dept: PRIMARY CARE | Facility: CLINIC | Age: 65
End: 2025-11-07
Payer: COMMERCIAL

## (undated) DEVICE — GOWN,AURORA,NONREINFORCED,LARGE: Brand: MEDLINE

## (undated) DEVICE — DRESSING PETRO W3XL8IN OIL EMUL N ADH GZ KNIT IMPREG CELOS

## (undated) DEVICE — SET,IRRIGATION,CYSTO/TUR: Brand: MEDLINE

## (undated) DEVICE — GLOVE ORANGE PI 7 1/2   MSG9075

## (undated) DEVICE — SINGLE PORT MANIFOLD: Brand: NEPTUNE 2

## (undated) DEVICE — SPONGE GZ W4XL4IN COT 12 PLY TYP VII WVN C FLD DSGN

## (undated) DEVICE — ZIMMER® STERILE DISPOSABLE TOURNIQUET CUFF, DUAL PORT, SINGLE BLADDER, 18 IN. (46 CM)

## (undated) DEVICE — SUTURE ETHLN SZ 3-0 L18IN NONABSORBABLE BLK PS-2 L19MM 3/8 1669H

## (undated) DEVICE — LABEL MED MINI W/ MARKER

## (undated) DEVICE — GLOVE ORTHO 7 1/2   MSG9475

## (undated) DEVICE — 1010 S-DRAPE TOWEL DRAPE 10/BX: Brand: STERI-DRAPE™

## (undated) DEVICE — TUBING, SUCTION, 1/4" X 10', STRAIGHT: Brand: MEDLINE

## (undated) DEVICE — HAND II: Brand: MEDLINE INDUSTRIES, INC.

## (undated) DEVICE — GLOVE ORANGE PI 7   MSG9070

## (undated) DEVICE — APPLICATOR MEDICATED 26 CC SOLUTION HI LT ORNG CHLORAPREP

## (undated) DEVICE — WRAP COHESIVE W2INXL5YD TAN SELF ADH BNDG HND NON STERILE TEAR CARING

## (undated) DEVICE — COVER LT HNDL BLU PLAS